# Patient Record
Sex: FEMALE | Race: BLACK OR AFRICAN AMERICAN | NOT HISPANIC OR LATINO
[De-identification: names, ages, dates, MRNs, and addresses within clinical notes are randomized per-mention and may not be internally consistent; named-entity substitution may affect disease eponyms.]

---

## 2019-05-02 ENCOUNTER — APPOINTMENT (OUTPATIENT)
Dept: OPHTHALMOLOGY | Facility: CLINIC | Age: 79
End: 2019-05-02
Payer: COMMERCIAL

## 2019-05-02 DIAGNOSIS — Z86.39 PERSONAL HISTORY OF OTHER ENDOCRINE, NUTRITIONAL AND METABOLIC DISEASE: ICD-10-CM

## 2019-05-02 DIAGNOSIS — H35.053 RETINAL NEOVASCULARIZATION, UNSPECIFIED, BILATERAL: ICD-10-CM

## 2019-05-02 DIAGNOSIS — Z78.9 OTHER SPECIFIED HEALTH STATUS: ICD-10-CM

## 2019-05-02 DIAGNOSIS — Z86.79 PERSONAL HISTORY OF OTHER DISEASES OF THE CIRCULATORY SYSTEM: ICD-10-CM

## 2019-05-02 DIAGNOSIS — H40.003 PREGLAUCOMA, UNSPECIFIED, BILATERAL: ICD-10-CM

## 2019-05-02 PROCEDURE — 92225: CPT | Mod: LT

## 2019-05-02 PROCEDURE — 92004 COMPRE OPH EXAM NEW PT 1/>: CPT

## 2019-05-02 PROCEDURE — 92134 CPTRZ OPH DX IMG PST SGM RTA: CPT

## 2019-09-05 ENCOUNTER — NON-APPOINTMENT (OUTPATIENT)
Age: 79
End: 2019-09-05

## 2019-09-05 ENCOUNTER — APPOINTMENT (OUTPATIENT)
Dept: OPHTHALMOLOGY | Facility: CLINIC | Age: 79
End: 2019-09-05
Payer: MEDICARE

## 2019-09-05 PROCEDURE — 76514 ECHO EXAM OF EYE THICKNESS: CPT

## 2019-09-05 PROCEDURE — 92012 INTRM OPH EXAM EST PATIENT: CPT

## 2019-09-05 PROCEDURE — 92083 EXTENDED VISUAL FIELD XM: CPT

## 2020-01-08 ENCOUNTER — APPOINTMENT (OUTPATIENT)
Dept: OPHTHALMOLOGY | Facility: CLINIC | Age: 80
End: 2020-01-08

## 2020-02-26 ENCOUNTER — NON-APPOINTMENT (OUTPATIENT)
Age: 80
End: 2020-02-26

## 2020-02-26 ENCOUNTER — APPOINTMENT (OUTPATIENT)
Dept: OPHTHALMOLOGY | Facility: CLINIC | Age: 80
End: 2020-02-26
Payer: MEDICARE

## 2020-02-26 PROCEDURE — 92012 INTRM OPH EXAM EST PATIENT: CPT

## 2020-03-26 ENCOUNTER — APPOINTMENT (OUTPATIENT)
Dept: OPHTHALMOLOGY | Facility: CLINIC | Age: 80
End: 2020-03-26

## 2020-10-22 ENCOUNTER — APPOINTMENT (OUTPATIENT)
Dept: CARDIOLOGY | Facility: CLINIC | Age: 80
End: 2020-10-22
Payer: MEDICARE

## 2020-10-22 ENCOUNTER — NON-APPOINTMENT (OUTPATIENT)
Age: 80
End: 2020-10-22

## 2020-10-22 VITALS
HEIGHT: 67 IN | SYSTOLIC BLOOD PRESSURE: 190 MMHG | WEIGHT: 240 LBS | RESPIRATION RATE: 20 BRPM | DIASTOLIC BLOOD PRESSURE: 100 MMHG | TEMPERATURE: 97.7 F | OXYGEN SATURATION: 97 % | BODY MASS INDEX: 37.67 KG/M2 | HEART RATE: 89 BPM

## 2020-10-22 DIAGNOSIS — R06.00 DYSPNEA, UNSPECIFIED: ICD-10-CM

## 2020-10-22 PROCEDURE — 99203 OFFICE O/P NEW LOW 30 MIN: CPT

## 2020-10-22 PROCEDURE — 93000 ELECTROCARDIOGRAM COMPLETE: CPT

## 2020-10-22 NOTE — PHYSICAL EXAM
[General Appearance - Well Developed] : well developed [Normal Appearance] : normal appearance [Well Groomed] : well groomed [General Appearance - Well Nourished] : well nourished [No Deformities] : no deformities [General Appearance - In No Acute Distress] : no acute distress [Normal Conjunctiva] : the conjunctiva exhibited no abnormalities [Eyelids - No Xanthelasma] : the eyelids demonstrated no xanthelasmas [Normal Oral Mucosa] : normal oral mucosa [No Oral Pallor] : no oral pallor [No Oral Cyanosis] : no oral cyanosis [Normal Jugular Venous A Waves Present] : normal jugular venous A waves present [Normal Jugular Venous V Waves Present] : normal jugular venous V waves present [No Jugular Venous Singh A Waves] : no jugular venous singh A waves [Normal Rate] : normal [Normal S1] : normal S1 [Normal S2] : normal S2 [No Murmur] : no murmurs heard [2+] : left 2+ [No Abnormalities] : the abdominal aorta was not enlarged and no bruit was heard [No Pitting Edema] : no pitting edema present [Respiration, Rhythm And Depth] : normal respiratory rhythm and effort [Exaggerated Use Of Accessory Muscles For Inspiration] : no accessory muscle use [Auscultation Breath Sounds / Voice Sounds] : lungs were clear to auscultation bilaterally [Abdomen Soft] : soft [Abdomen Tenderness] : non-tender [Abdomen Mass (___ Cm)] : no abdominal mass palpated [Abnormal Walk] : normal gait [Gait - Sufficient For Exercise Testing] : the gait was sufficient for exercise testing [Nail Clubbing] : no clubbing of the fingernails [Cyanosis, Localized] : no localized cyanosis [Petechial Hemorrhages (___cm)] : no petechial hemorrhages [Skin Color & Pigmentation] : normal skin color and pigmentation [] : no rash [No Venous Stasis] : no venous stasis [Skin Lesions] : no skin lesions [No Skin Ulcers] : no skin ulcer [No Xanthoma] : no  xanthoma was observed [Oriented To Time, Place, And Person] : oriented to person, place, and time [Affect] : the affect was normal [Mood] : the mood was normal [No Anxiety] : not feeling anxious [S3] : no S3 [S4] : no S4 [Right Carotid Bruit] : no bruit heard over the right carotid [Left Carotid Bruit] : no bruit heard over the left carotid [Right Femoral Bruit] : no bruit heard over the right femoral artery [Left Femoral Bruit] : no bruit heard over the left femoral artery

## 2020-10-22 NOTE — DISCUSSION/SUMMARY
[Hypertension] : hypertension [Not Responding to Treatment] : not responding to treatment [Echocardiogram] : echocardiogram [Venous Insufficiency] : venous insufficiency [Congestive Heart Failure] : congestive heart failure [Stable] : stable [de-identified] : paced [de-identified] : icd eval [de-identified] : did not take meds

## 2020-10-22 NOTE — REASON FOR VISIT
[Consultation] : a consultation regarding [FreeTextEntry2] : pt with h/o chf, icd, presumed cmp, htn admits to occasioan dyspnea and le edema switicng cardiologists

## 2020-11-25 ENCOUNTER — APPOINTMENT (OUTPATIENT)
Dept: CARDIOLOGY | Facility: CLINIC | Age: 80
End: 2020-11-25
Payer: MEDICARE

## 2020-11-25 PROCEDURE — 93306 TTE W/DOPPLER COMPLETE: CPT

## 2020-11-25 PROCEDURE — 93289 INTERROG DEVICE EVAL HEART: CPT

## 2020-11-25 NOTE — PROCEDURE
[CRT-D] : Cardiac resynchronization therapy defibrillator [DDD] : DDD [Longevity: ___ months] : The estimated remaining battery life is [unfilled] months [Sensing Amplitude ___mv] : sensing amplitude was [unfilled] mv [Lead Imp:  ___ohms] : lead impedance was [unfilled] ohms [___V @] : [unfilled] V [___ ms] : [unfilled] ms [de-identified] : BIV PACED [de-identified] : [facility] [de-identified] : N160 [de-identified] : 3/7/2013 [de-identified] :  [de-identified] : APACED 12%\par VPACED 100%\par 2 EPISODES OF SVT  LONGEST 8 SECONDS  170-180 BPM

## 2021-03-24 ENCOUNTER — APPOINTMENT (OUTPATIENT)
Dept: CARDIOLOGY | Facility: CLINIC | Age: 81
End: 2021-03-24
Payer: MEDICARE

## 2021-03-24 PROCEDURE — 99072 ADDL SUPL MATRL&STAF TM PHE: CPT

## 2021-03-24 PROCEDURE — 93289 INTERROG DEVICE EVAL HEART: CPT

## 2021-03-24 NOTE — PROCEDURE
[CRT-D] : Cardiac resynchronization therapy defibrillator [DDD] : DDD [Longevity: ___ months] : The estimated remaining battery life is [unfilled] months [Lead Imp:  ___ohms] : lead impedance was [unfilled] ohms [Sensing Amplitude ___mv] : sensing amplitude was [unfilled] mv [___V @] : [unfilled] V [___ ms] : [unfilled] ms [de-identified] : BIV PACED [de-identified] : [facility] [de-identified] : N160 [de-identified] : 3/7/2013 [de-identified] :  [de-identified] : APACED 15\par VPACED 100%\par no therapies

## 2021-07-28 ENCOUNTER — APPOINTMENT (OUTPATIENT)
Dept: CARDIOLOGY | Facility: CLINIC | Age: 81
End: 2021-07-28
Payer: MEDICARE

## 2021-07-28 PROCEDURE — 93289 INTERROG DEVICE EVAL HEART: CPT

## 2021-07-28 NOTE — PROCEDURE
[CRT-D] : Cardiac resynchronization therapy defibrillator [DDD] : DDD [Longevity: ___ months] : The estimated remaining battery life is [unfilled] months [Lead Imp:  ___ohms] : lead impedance was [unfilled] ohms [Sensing Amplitude ___mv] : sensing amplitude was [unfilled] mv [___V @] : [unfilled] V [___ ms] : [unfilled] ms [de-identified] : BIV PACED [de-identified] : [facility] [de-identified] : N160 [de-identified] : 3/7/2013 [de-identified] :  [de-identified] : APACED 13\par VPACED 100%\par 4 episodes of atrial tach 160 bpm longest 15 beats\par no therapies

## 2021-09-04 ENCOUNTER — EMERGENCY (EMERGENCY)
Facility: HOSPITAL | Age: 81
LOS: 1 days | Discharge: HOME | End: 2021-09-04
Attending: EMERGENCY MEDICINE | Admitting: EMERGENCY MEDICINE
Payer: MEDICARE

## 2021-09-04 VITALS
TEMPERATURE: 98 F | OXYGEN SATURATION: 100 % | HEART RATE: 73 BPM | SYSTOLIC BLOOD PRESSURE: 152 MMHG | RESPIRATION RATE: 16 BRPM | DIASTOLIC BLOOD PRESSURE: 73 MMHG

## 2021-09-04 VITALS
OXYGEN SATURATION: 100 % | RESPIRATION RATE: 16 BRPM | TEMPERATURE: 98 F | DIASTOLIC BLOOD PRESSURE: 87 MMHG | SYSTOLIC BLOOD PRESSURE: 179 MMHG | HEART RATE: 63 BPM

## 2021-09-04 LAB
ALBUMIN SERPL ELPH-MCNC: 4.8 G/DL — SIGNIFICANT CHANGE UP (ref 3.3–5)
ALP SERPL-CCNC: 73 U/L — SIGNIFICANT CHANGE UP (ref 40–120)
ALT FLD-CCNC: 11 U/L — SIGNIFICANT CHANGE UP (ref 4–33)
ANION GAP SERPL CALC-SCNC: 14 MMOL/L — SIGNIFICANT CHANGE UP (ref 7–14)
APPEARANCE UR: CLEAR — SIGNIFICANT CHANGE UP
AST SERPL-CCNC: 17 U/L — SIGNIFICANT CHANGE UP (ref 4–32)
BASE EXCESS BLDV CALC-SCNC: -0.4 MMOL/L — SIGNIFICANT CHANGE UP (ref -2–3)
BASOPHILS # BLD AUTO: 0.01 K/UL — SIGNIFICANT CHANGE UP (ref 0–0.2)
BASOPHILS # BLD AUTO: 0.03 K/UL — SIGNIFICANT CHANGE UP (ref 0–0.2)
BASOPHILS NFR BLD AUTO: 0.2 % — SIGNIFICANT CHANGE UP (ref 0–2)
BASOPHILS NFR BLD AUTO: 0.3 % — SIGNIFICANT CHANGE UP (ref 0–2)
BILIRUB SERPL-MCNC: 0.3 MG/DL — SIGNIFICANT CHANGE UP (ref 0.2–1.2)
BILIRUB UR-MCNC: NEGATIVE — SIGNIFICANT CHANGE UP
BLOOD GAS VENOUS COMPREHENSIVE RESULT: SIGNIFICANT CHANGE UP
BUN SERPL-MCNC: 15 MG/DL — SIGNIFICANT CHANGE UP (ref 7–23)
CALCIUM SERPL-MCNC: 9.8 MG/DL — SIGNIFICANT CHANGE UP (ref 8.4–10.5)
CHLORIDE BLDV-SCNC: 109 MMOL/L — HIGH (ref 96–108)
CHLORIDE SERPL-SCNC: 96 MMOL/L — LOW (ref 98–107)
CO2 BLDV-SCNC: 26.1 MMOL/L — HIGH (ref 22–26)
CO2 SERPL-SCNC: 25 MMOL/L — SIGNIFICANT CHANGE UP (ref 22–31)
COLOR SPEC: COLORLESS — SIGNIFICANT CHANGE UP
CREAT SERPL-MCNC: 0.83 MG/DL — SIGNIFICANT CHANGE UP (ref 0.5–1.3)
DIFF PNL FLD: NEGATIVE — SIGNIFICANT CHANGE UP
EOSINOPHIL # BLD AUTO: 0.01 K/UL — SIGNIFICANT CHANGE UP (ref 0–0.5)
EOSINOPHIL # BLD AUTO: 0.23 K/UL — SIGNIFICANT CHANGE UP (ref 0–0.5)
EOSINOPHIL NFR BLD AUTO: 0.2 % — SIGNIFICANT CHANGE UP (ref 0–6)
EOSINOPHIL NFR BLD AUTO: 1.9 % — SIGNIFICANT CHANGE UP (ref 0–6)
GAS PNL BLDV: 136 MMOL/L — SIGNIFICANT CHANGE UP (ref 136–145)
GLUCOSE BLDV-MCNC: 257 MG/DL — HIGH (ref 70–99)
GLUCOSE SERPL-MCNC: 142 MG/DL — HIGH (ref 70–99)
GLUCOSE UR QL: NEGATIVE — SIGNIFICANT CHANGE UP
HCO3 BLDV-SCNC: 25 MMOL/L — SIGNIFICANT CHANGE UP (ref 22–29)
HCT VFR BLD CALC: 38 % — SIGNIFICANT CHANGE UP (ref 34.5–45)
HCT VFR BLD CALC: 38.7 % — SIGNIFICANT CHANGE UP (ref 34.5–45)
HCT VFR BLDA CALC: 38 % — SIGNIFICANT CHANGE UP (ref 34.5–46.5)
HGB BLD CALC-MCNC: 12.8 G/DL — SIGNIFICANT CHANGE UP (ref 11.5–15.5)
HGB BLD-MCNC: 12.6 G/DL — SIGNIFICANT CHANGE UP (ref 11.5–15.5)
HGB BLD-MCNC: 13.2 G/DL — SIGNIFICANT CHANGE UP (ref 11.5–15.5)
IANC: 3.47 K/UL — SIGNIFICANT CHANGE UP (ref 1.5–8.5)
IANC: 7.58 K/UL — SIGNIFICANT CHANGE UP (ref 1.5–8.5)
IMM GRANULOCYTES NFR BLD AUTO: 0.2 % — SIGNIFICANT CHANGE UP (ref 0–1.5)
IMM GRANULOCYTES NFR BLD AUTO: 0.5 % — SIGNIFICANT CHANGE UP (ref 0–1.5)
KETONES UR-MCNC: ABNORMAL
LACTATE BLDV-MCNC: 1.7 MMOL/L — SIGNIFICANT CHANGE UP (ref 0.5–2)
LACTATE BLDV-MCNC: 3.1 MMOL/L — HIGH (ref 0.5–2)
LEUKOCYTE ESTERASE UR-ACNC: NEGATIVE — SIGNIFICANT CHANGE UP
LYMPHOCYTES # BLD AUTO: 0.95 K/UL — LOW (ref 1–3.3)
LYMPHOCYTES # BLD AUTO: 20.1 % — SIGNIFICANT CHANGE UP (ref 13–44)
LYMPHOCYTES # BLD AUTO: 25.9 % — SIGNIFICANT CHANGE UP (ref 13–44)
LYMPHOCYTES # BLD AUTO: 3.08 K/UL — SIGNIFICANT CHANGE UP (ref 1–3.3)
MCHC RBC-ENTMCNC: 30.4 PG — SIGNIFICANT CHANGE UP (ref 27–34)
MCHC RBC-ENTMCNC: 30.8 PG — SIGNIFICANT CHANGE UP (ref 27–34)
MCHC RBC-ENTMCNC: 33.2 GM/DL — SIGNIFICANT CHANGE UP (ref 32–36)
MCHC RBC-ENTMCNC: 34.1 GM/DL — SIGNIFICANT CHANGE UP (ref 32–36)
MCV RBC AUTO: 90.2 FL — SIGNIFICANT CHANGE UP (ref 80–100)
MCV RBC AUTO: 91.8 FL — SIGNIFICANT CHANGE UP (ref 80–100)
MONOCYTES # BLD AUTO: 0.27 K/UL — SIGNIFICANT CHANGE UP (ref 0–0.9)
MONOCYTES # BLD AUTO: 0.9 K/UL — SIGNIFICANT CHANGE UP (ref 0–0.9)
MONOCYTES NFR BLD AUTO: 5.7 % — SIGNIFICANT CHANGE UP (ref 2–14)
MONOCYTES NFR BLD AUTO: 7.6 % — SIGNIFICANT CHANGE UP (ref 2–14)
NEUTROPHILS # BLD AUTO: 3.47 K/UL — SIGNIFICANT CHANGE UP (ref 1.8–7.4)
NEUTROPHILS # BLD AUTO: 7.58 K/UL — HIGH (ref 1.8–7.4)
NEUTROPHILS NFR BLD AUTO: 63.8 % — SIGNIFICANT CHANGE UP (ref 43–77)
NEUTROPHILS NFR BLD AUTO: 73.6 % — SIGNIFICANT CHANGE UP (ref 43–77)
NITRITE UR-MCNC: NEGATIVE — SIGNIFICANT CHANGE UP
NRBC # BLD: 0 /100 WBCS — SIGNIFICANT CHANGE UP
NRBC # BLD: 0 /100 WBCS — SIGNIFICANT CHANGE UP
NRBC # FLD: 0 K/UL — SIGNIFICANT CHANGE UP
NRBC # FLD: 0 K/UL — SIGNIFICANT CHANGE UP
PCO2 BLDV: 42 MMHG — SIGNIFICANT CHANGE UP (ref 39–42)
PH BLDV: 7.38 — SIGNIFICANT CHANGE UP (ref 7.32–7.43)
PH UR: 7.5 — SIGNIFICANT CHANGE UP (ref 5–8)
PLATELET # BLD AUTO: 108 K/UL — LOW (ref 150–400)
PLATELET # BLD AUTO: 283 K/UL — SIGNIFICANT CHANGE UP (ref 150–400)
PO2 BLDV: 39 MMHG — SIGNIFICANT CHANGE UP
POTASSIUM BLDV-SCNC: 4.1 MMOL/L — SIGNIFICANT CHANGE UP (ref 3.5–5.1)
POTASSIUM SERPL-MCNC: 4.1 MMOL/L — SIGNIFICANT CHANGE UP (ref 3.5–5.3)
POTASSIUM SERPL-SCNC: 4.1 MMOL/L — SIGNIFICANT CHANGE UP (ref 3.5–5.3)
PROT SERPL-MCNC: 8.5 G/DL — HIGH (ref 6–8.3)
PROT UR-MCNC: NEGATIVE — SIGNIFICANT CHANGE UP
RBC # BLD: 4.14 M/UL — SIGNIFICANT CHANGE UP (ref 3.8–5.2)
RBC # BLD: 4.29 M/UL — SIGNIFICANT CHANGE UP (ref 3.8–5.2)
RBC # FLD: 12.9 % — SIGNIFICANT CHANGE UP (ref 10.3–14.5)
RBC # FLD: 13.2 % — SIGNIFICANT CHANGE UP (ref 10.3–14.5)
SAO2 % BLDV: 64.8 % — SIGNIFICANT CHANGE UP
SODIUM SERPL-SCNC: 135 MMOL/L — SIGNIFICANT CHANGE UP (ref 135–145)
SP GR SPEC: 1.01 — SIGNIFICANT CHANGE UP (ref 1–1.05)
TROPONIN T, HIGH SENSITIVITY RESULT: 10 NG/L — SIGNIFICANT CHANGE UP
UROBILINOGEN FLD QL: SIGNIFICANT CHANGE UP
WBC # BLD: 11.88 K/UL — HIGH (ref 3.8–10.5)
WBC # BLD: 4.72 K/UL — SIGNIFICANT CHANGE UP (ref 3.8–10.5)
WBC # FLD AUTO: 11.88 K/UL — HIGH (ref 3.8–10.5)
WBC # FLD AUTO: 4.72 K/UL — SIGNIFICANT CHANGE UP (ref 3.8–10.5)

## 2021-09-04 PROCEDURE — 93010 ELECTROCARDIOGRAM REPORT: CPT

## 2021-09-04 PROCEDURE — 99285 EMERGENCY DEPT VISIT HI MDM: CPT | Mod: 25

## 2021-09-04 RX ORDER — MECLIZINE HCL 12.5 MG
1 TABLET ORAL
Qty: 30 | Refills: 0
Start: 2021-09-04

## 2021-09-04 RX ORDER — SODIUM CHLORIDE 9 MG/ML
1000 INJECTION INTRAMUSCULAR; INTRAVENOUS; SUBCUTANEOUS ONCE
Refills: 0 | Status: COMPLETED | OUTPATIENT
Start: 2021-09-04 | End: 2021-09-04

## 2021-09-04 RX ORDER — MECLIZINE HCL 12.5 MG
50 TABLET ORAL ONCE
Refills: 0 | Status: COMPLETED | OUTPATIENT
Start: 2021-09-04 | End: 2021-09-04

## 2021-09-04 RX ORDER — METOCLOPRAMIDE HCL 10 MG
10 TABLET ORAL ONCE
Refills: 0 | Status: COMPLETED | OUTPATIENT
Start: 2021-09-04 | End: 2021-09-04

## 2021-09-04 RX ADMIN — SODIUM CHLORIDE 1000 MILLILITER(S): 9 INJECTION INTRAMUSCULAR; INTRAVENOUS; SUBCUTANEOUS at 04:02

## 2021-09-04 RX ADMIN — Medication 10 MILLIGRAM(S): at 03:25

## 2021-09-04 RX ADMIN — Medication 50 MILLIGRAM(S): at 03:24

## 2021-09-04 NOTE — ED ADULT NURSE REASSESSMENT NOTE - NS ED NURSE REASSESS COMMENT FT1
Pt A&Ox4, resting comfortably. Breathing even and unlabored. Pt was able to tolerate PO. Denies nausea/vomiting. Endorses mild dizziness but reports improvement since medications. Will endorse to day shift.

## 2021-09-04 NOTE — ED ADULT NURSE NOTE - OBJECTIVE STATEMENT
Pt received in rm 26. C/o dizziness, tinnitus, nausea and vomiting last night. States was getting ready to use the bathroom when she felt dizzy, had ringing in ears  and was nauseous. Denies LOC. States son was able to assist her. Pt took home medications but was not able to keep them down and vomited. Hx of CHF, HTN, and pacemaker. A&Ox4, ambulatory with a cane. Breathing even and unlabored. NSR on the cardiac monitor. No complaints of chest pain, headache, abd pain, nausea, vomiting, SOB, fever, or chills at this time. NAD. Waiting for MD beckham. Will continue to monitor.

## 2021-09-04 NOTE — ED PROVIDER NOTE - OBJECTIVE STATEMENT
80yo F h.o HTN/HLD, CHF presenting to ED with dizziness since last night around 10pm first noted when attempting to rise from couch, began mild however progressed to severe with limited ambulation, thereafter developed nausea and a few episodes of nb/nb vomiting. Dizziness is room spinning, improves when laying flat and worse with turning head to side, no headache or visual changes. Denies cp or sob, no recent illness or cough, no fever. ROS significant for diarrhea starting around the same time as the other symptoms. Currently dizziness is absent, however minimal when she is moving around.

## 2021-09-04 NOTE — ED PROVIDER NOTE - ST/T WAVE
How Severe Are They?: mild
Is This A New Presentation, Or A Follow-Up?: Follow Up [facility]
n/l

## 2021-09-04 NOTE — ED PROVIDER NOTE - ATTENDING CONTRIBUTION TO CARE
The patient is a 81y Female who has a past medical and surgery history of  PTED with Pt c/o dizziness, nausea and multiple episodes of vomiting since tonight. Pt states she was trying to get up to use the bathroom when she became dizzy. pt states it feels like the room is spinning  and worse with movement.    Vital Signs Stable  PE: as described; my additions and exceptions are noted in the chart  DATA:  EKG:   LAB: Pending at time of evaluation            IMPRESSION/RISK:  Dx=  Differential includes but not limited to conditions listed in order of most possible first:   Consideration include  Plan

## 2021-09-04 NOTE — ED PROVIDER NOTE - PROGRESS NOTE DETAILS
Juan Diego Hoang, PGY-2: Pt reexamined at bedside, resting comfortably, vitals stable. No longer complaining of dizziness or nausea after reglan and meclizine. Still has mild symptoms when turning head rapidly over right shoulder. Labs are wnl, currently awaiting repeat troponin. EKG non-ischemic PA Grancaric: Pt feeling better. Resting comfortably. Dr. Fortune: Pt was signed out to me awaiting repeat trop and re-eval. Pt states feeling better. DAILY Oliveira: pt passed PO challenge. Ambulating with steady gait. Pt given return precautions

## 2021-09-04 NOTE — ED PROVIDER NOTE - PATIENT PORTAL LINK FT
You can access the FollowMyHealth Patient Portal offered by [facility] by registering at the following website: [email or web address]. By joining [facility]’s FollowMyHealth portal, you will also be able to view your health information using other applications (apps) compatible with our system.

## 2021-09-04 NOTE — ED PROVIDER NOTE - PHYSICAL EXAMINATION
GENERAL: non-toxic appearing, in NAD  HEAD: atraumatic, normocephalic  EYES: vision grossly intact, no conjunctivitis or discharge  EARS: hearing grossly intact, TMs clear and no lesions within ear canal  NOSE: no nasal discharge, epistaxis   CARDIAC: RRR, normal S1S2,  no appreciable murmurs, no cyanosis, cap refill < 2 seconds  PULM: no respiratory distress, oxygen saturation on RA wnl, CTAB, no crackles, rales, rhonchi, or wheezing  GI: abdomen nondistended, soft, nontender, no guarding or rebound tenderness, no palpable masses  NEURO: awake and alert, follows commands, normal speech, PERRLA, EOMI, no focal motor or sensory deficits, normal gait. Strength 5/5  MSK: spine appears normal, no joint swelling or erythema, no gross deformities of extremities  EXT: no peripheral edema, calf tenderness, redness or swelling  SKIN: warm, dry, and intact, no rashes  PSYCH: appropriate mood and affect

## 2021-09-04 NOTE — ED ADULT TRIAGE NOTE - CHIEF COMPLAINT QUOTE
Pt c/o dizziness, nausea and multiple episodes of vomiting since tonight. Pt states she was trying to get up to use the bathroom when she became dizzy. pt states it feels like the room is spinning  and worse with movement. ..no

## 2021-09-04 NOTE — ED PROVIDER NOTE - NSFOLLOWUPINSTRUCTIONS_ED_ALL_ED_FT
The patient is a 81y Female who has a past medical and surgery history of  PTED with Pt c/o dizziness, nausea and multiple episodes of vomiting since tonight. Pt states she was trying to get up to use the bathroom when she became dizzy. pt states it feels like the room is spinning  and worse with movement.    Vital Signs Stable  PE: as described; my additions and exceptions are noted in the chart  DATA:  EKG:   LAB: Pending at time of evaluation            IMPRESSION/RISK:  Dx=  Differential includes but not limited to conditions listed in order of most possible first:   Consideration include  Plan Vertigo  Vertigo is the feeling that you or your surroundings are moving when they are not. Vertigo can be dangerous if it occurs while you are doing something that could endanger you or others, such as driving.    What are the causes?  This condition is caused by a disturbance in the signals that are sent by your body’s sensory systems to your brain. Different causes of a disturbance can lead to vertigo, including:    Infections, especially in the inner ear.  A bad reaction to a drug, or misuse of alcohol and medicines.  Withdrawal from drugs or alcohol.  Quickly changing positions, as when lying down or rolling over in bed.  Migraine headaches.  Decreased blood flow to the brain.  Decreased blood pressure.  Increased pressure in the brain from a head or neck injury, stroke, infection, tumor, or bleeding.  Central nervous system disorders.    What are the signs or symptoms?  Symptoms of this condition usually occur when you move your head or your eyes in different directions. Symptoms may start suddenly, and they usually last for less than a minute. Symptoms may include:    Loss of balance and falling.  Feeling like you are spinning or moving.  Feeling like your surroundings are spinning or moving.  Nausea and vomiting.  Blurred vision or double vision.  Difficulty hearing.  Slurred speech.  Dizziness.  Involuntary eye movement (nystagmus).    Symptoms can be mild and cause only slight annoyance, or they can be severe and interfere with daily life. Episodes of vertigo may return (recur) over time, and they are often triggered by certain movements. Symptoms may improve over time.    How is this diagnosed?  This condition may be diagnosed based on medical history and the quality of your nystagmus. Your health care provider may test your eye movements by asking you to quickly change positions to trigger the nystagmus. This may be called the Marcos-Hallpike test, head thrust test, or roll test. You may be referred to a health care provider who specializes in ear, nose, and throat (ENT) problems (otolaryngologist) or a provider who specializes in disorders of the central nervous system (neurologist).    You may have additional testing, including:    A physical exam.  Blood tests.  MRI.  A CT scan.  An electrocardiogram (ECG). This records electrical activity in your heart.  An electroencephalogram (EEG). This records electrical activity in your brain.  Hearing tests.    How is this treated?  Treatment for this condition depends on the cause and the severity of the symptoms. Treatment options include:    Medicines to treat nausea or vertigo. These are usually used for severe cases. Some medicines that are used to treat other conditions may also reduce or eliminate vertigo symptoms. These include:    Medicines that control allergies (antihistamines).  Medicines that control seizures (anticonvulsants).  Medicines that relieve depression (antidepressants).  Medicines that relieve anxiety (sedatives).    Head movements to adjust your inner ear back to normal. If your vertigo is caused by an ear problem, your health care provider may recommend certain movements to correct the problem.  Surgery. This is rare.    Follow these instructions at home:  Safety     Move slowly.Avoid sudden body or head movements.  Avoid driving.  Avoid operating heavy machinery.  Avoid doing any tasks that would cause danger to you or others if you would have a vertigo episode during the task.  If you have trouble walking or keeping your balance, try using a cane for stability. If you feel dizzy or unstable, sit down right away.  Return to your normal activities as told by your health care provider. Ask your health care provider what activities are safe for you.  General instructions     Take over-the-counter and prescription medicines only as told by your health care provider.  Avoid certain positions or movements as told by your health care provider.  Drink enough fluid to keep your urine clear or pale yellow.  Keep all follow-up visits as told by your health care provider. This is important.  Contact a health care provider if:  Your medicines do not relieve your vertigo or they make it worse.  You have a fever.  Your condition gets worse or you develop new symptoms.  Your family or friends notice any behavioral changes.  Your nausea or vomiting gets worse.  You have numbness or a “pins and needles” sensation in part of your body.  Get help right away if:  You have difficulty moving or speaking.  You are always dizzy.  You faint.  You develop severe headaches.  You have weakness in your hands, arms, or legs.  You have changes in your hearing or vision.  You develop a stiff neck.  You develop sensitivity to light.

## 2021-09-04 NOTE — ED PROVIDER NOTE - CLINICAL SUMMARY MEDICAL DECISION MAKING FREE TEXT BOX
82yo F w positional dizziness preceding nausea and vomiting, improved when still, no nystagmus on exam and no signs of central vertigo. NIHSS stroke scale 0. Will provide symptomatic control with Reglan and meclizine, EKG is non-ischemic and follow up with trop x2. PO challenge if all improves

## 2021-09-05 LAB
CULTURE RESULTS: SIGNIFICANT CHANGE UP
SPECIMEN SOURCE: SIGNIFICANT CHANGE UP

## 2021-11-03 ENCOUNTER — APPOINTMENT (OUTPATIENT)
Dept: CARDIOLOGY | Facility: CLINIC | Age: 81
End: 2021-11-03

## 2021-11-23 ENCOUNTER — APPOINTMENT (OUTPATIENT)
Dept: CARDIOLOGY | Facility: CLINIC | Age: 81
End: 2021-11-23
Payer: MEDICARE

## 2021-11-23 PROCEDURE — 93289 INTERROG DEVICE EVAL HEART: CPT

## 2021-11-23 NOTE — PROCEDURE
[CRT-D] : Cardiac resynchronization therapy defibrillator [DDD] : DDD [Longevity: ___ months] : The estimated remaining battery life is [unfilled] months [Lead Imp:  ___ohms] : lead impedance was [unfilled] ohms [Sensing Amplitude ___mv] : sensing amplitude was [unfilled] mv [___V @] : [unfilled] V [___ ms] : [unfilled] ms [de-identified] : BIV PACED [de-identified] : [facility] [de-identified] : N160 [de-identified] : 3/7/2013 [de-identified] :  [de-identified] : APACED 14\par VPACED 100%\par 2 episodes of atrial tach 100 bpm longest 11 beats\par 1 episode of vt 19 beats 166\par no therapies

## 2022-01-19 ENCOUNTER — APPOINTMENT (OUTPATIENT)
Dept: CARDIOLOGY | Facility: CLINIC | Age: 82
End: 2022-01-19
Payer: MEDICARE

## 2022-01-19 ENCOUNTER — NON-APPOINTMENT (OUTPATIENT)
Age: 82
End: 2022-01-19

## 2022-01-19 VITALS
HEART RATE: 68 BPM | OXYGEN SATURATION: 96 % | SYSTOLIC BLOOD PRESSURE: 158 MMHG | WEIGHT: 240 LBS | BODY MASS INDEX: 37.67 KG/M2 | DIASTOLIC BLOOD PRESSURE: 110 MMHG | HEIGHT: 67 IN

## 2022-01-19 VITALS — SYSTOLIC BLOOD PRESSURE: 148 MMHG | DIASTOLIC BLOOD PRESSURE: 90 MMHG

## 2022-01-19 PROCEDURE — 93000 ELECTROCARDIOGRAM COMPLETE: CPT

## 2022-01-19 PROCEDURE — 99215 OFFICE O/P EST HI 40 MIN: CPT

## 2022-01-19 RX ORDER — PRAVASTATIN SODIUM 40 MG/1
40 TABLET ORAL
Refills: 0 | Status: ACTIVE | COMMUNITY

## 2022-01-19 RX ORDER — FLUTICASONE PROPIONATE 50 MCG
50 SPRAY, SUSPENSION NASAL
Refills: 0 | Status: ACTIVE | COMMUNITY

## 2022-01-19 RX ORDER — FUROSEMIDE 40 MG/1
40 TABLET ORAL
Qty: 30 | Refills: 3 | Status: ACTIVE | COMMUNITY

## 2022-01-19 RX ORDER — CARVEDILOL 25 MG/1
25 TABLET, FILM COATED ORAL TWICE DAILY
Qty: 180 | Refills: 0 | Status: ACTIVE | COMMUNITY

## 2022-01-19 RX ORDER — MECLIZINE HYDROCHLORIDE 25 MG/1
25 TABLET ORAL
Refills: 0 | Status: ACTIVE | COMMUNITY

## 2022-01-19 RX ORDER — ENALAPRIL MALEATE 20 MG/1
20 TABLET ORAL
Qty: 180 | Refills: 0 | Status: ACTIVE | COMMUNITY

## 2022-01-19 RX ORDER — GABAPENTIN 300 MG/1
300 CAPSULE ORAL
Refills: 0 | Status: ACTIVE | COMMUNITY

## 2022-01-19 NOTE — REASON FOR VISIT
[Follow-Up - Clinic] : a clinic follow-up of [FreeTextEntry2] : pt with h/o chf, icd, presumed cmp, htn admits to occasional dyspnea and le edema, pt last seen 10/2020, pt c/o occas vertigo, tinnitis

## 2022-01-19 NOTE — DISCUSSION/SUMMARY
[Hypertension] : hypertension [Venous Insufficiency] : venous insufficiency [Echocardiogram] : an echocardiogram [Stable] : stable [Outpatient Evaluation] : outpatient evaluation [Ambulatory BP Monitoring] : ambulatory blood pressure monitoring [Medication Changes Per Orders] : Medication changes are as documented in orders [Minutes Spent: ___] : for [unfilled] ~Uminutes [de-identified] : paced [de-identified] : icd eval [de-identified] : rsume low dose norvasc she self d/cd [FreeTextEntry2] : reviewed prior w/u and med list

## 2022-03-04 ENCOUNTER — APPOINTMENT (OUTPATIENT)
Dept: CARDIOLOGY | Facility: CLINIC | Age: 82
End: 2022-03-04
Payer: MEDICARE

## 2022-03-04 PROCEDURE — 93306 TTE W/DOPPLER COMPLETE: CPT

## 2022-03-23 ENCOUNTER — APPOINTMENT (OUTPATIENT)
Dept: CARDIOLOGY | Facility: CLINIC | Age: 82
End: 2022-03-23
Payer: MEDICARE

## 2022-03-23 PROCEDURE — 93289 INTERROG DEVICE EVAL HEART: CPT

## 2022-03-23 NOTE — PROCEDURE
[CRT-D] : Cardiac resynchronization therapy defibrillator [DDD] : DDD [Longevity: ___ months] : The estimated remaining battery life is [unfilled] months [Lead Imp:  ___ohms] : lead impedance was [unfilled] ohms [Sensing Amplitude ___mv] : sensing amplitude was [unfilled] mv [___V @] : [unfilled] V [___ ms] : [unfilled] ms [de-identified] : BIV PACED [de-identified] : [facility] [de-identified] : N160 [de-identified] : 3/7/2013 [de-identified] :  [de-identified] : APACED 6\par VPACED 100%\par 1 episode at 6 seconds\par 1 episode of vt 5 beats 200 bpm\par no therapies

## 2022-03-24 ENCOUNTER — APPOINTMENT (OUTPATIENT)
Dept: OTOLARYNGOLOGY | Facility: CLINIC | Age: 82
End: 2022-03-24
Payer: MEDICARE

## 2022-03-24 VITALS — HEART RATE: 87 BPM | SYSTOLIC BLOOD PRESSURE: 151 MMHG | DIASTOLIC BLOOD PRESSURE: 85 MMHG

## 2022-03-24 VITALS — BODY MASS INDEX: 37.67 KG/M2 | WEIGHT: 240 LBS | HEIGHT: 67 IN

## 2022-03-24 DIAGNOSIS — H90.5 UNSPECIFIED SENSORINEURAL HEARING LOSS: ICD-10-CM

## 2022-03-24 DIAGNOSIS — H81.90 UNSPECIFIED DISORDER OF VESTIBULAR FUNCTION, UNSPECIFIED EAR: ICD-10-CM

## 2022-03-24 DIAGNOSIS — Z78.9 OTHER SPECIFIED HEALTH STATUS: ICD-10-CM

## 2022-03-24 DIAGNOSIS — R42 DIZZINESS AND GIDDINESS: ICD-10-CM

## 2022-03-24 DIAGNOSIS — H90.3 SENSORINEURAL HEARING LOSS, BILATERAL: ICD-10-CM

## 2022-03-24 PROCEDURE — 99204 OFFICE O/P NEW MOD 45 MIN: CPT

## 2022-03-24 PROCEDURE — 92504 EAR MICROSCOPY EXAMINATION: CPT

## 2022-03-24 NOTE — HISTORY OF PRESENT ILLNESS
[de-identified] : 81 year old female here initial consultation referred by Dr. Wilfred Rios (ENT) for vertigo. \par States vertigo started in September 2021- reports feeling like the rooms was spinning.  \par Reports associated symptoms of nausea, vomiting and diarrhea, difficulty walking. \par Reports being hospitalized overnight and discharged home with Meclizine \par States after being discharged "vertigo kept coming back"- PCP prescribed Flonase\par Reports intermittent "slight" dizziness-uses Flonase with relief.  \par Reports tinnitus in right ear.-states it "sounds like crickets" \par States tinnitus fluctuates-louder in quiet rooms \par Reports hardly hearing anything from right ear when she lays on left side. \par Denies use of hearing aids. Patient denies otalgia, otorrhea, ear infections, headaches related to hearing.

## 2022-03-24 NOTE — DATA REVIEWED
[de-identified] : I have independently reviewed the patient's audiogram from [facility] and my findings include homero SNHL, R asymmetry with 68% SDS [de-identified] : CT reviewed, normal (no stroke)

## 2022-03-24 NOTE — REASON FOR VISIT
[Initial Consultation] : an initial consultation for [FreeTextEntry2] : referred by Dr. Rios for vertigo

## 2022-03-24 NOTE — CONSULT LETTER
[Dear  ___] : Dear  [unfilled], [Sincerely,] : Sincerely, [Consult Letter:] : I had the pleasure of evaluating your patient, [unfilled]. [Please see my note below.] : Please see my note below. [Consult Closing:] : Thank you very much for allowing me to participate in the care of this patient.  If you have any questions, please do not hesitate to contact me. [FreeTextEntry2] : Wilfred Rios MD [FreeTextEntry3] : Octavio Murrell MD, Otology Neurotology & Skull Base Surgery

## 2022-03-24 NOTE — PHYSICAL EXAM
[Midline] : trachea located in midline position [Binocular Microscopic Exam] : Binocular microscopic exam was performed [Hearing Loss Right Only] : normal [Hearing Loss Left Only] : normal [Rinne Test Air Conduction Persists > Bone Conduction Right] : air conduction greater than bone conduction on the right [Rinne Test Air Conduction Persists > Bone Conduction Left] : air conduction greater than bone conduction on the left [Normal] : cerebellar function was normal [Nystagmus] : ~T no ~M nystagmus was seen [Fukuda Step Test] : Fukuda Step Test was Negative [Romberg's Sign] : Romberg's sign was absent [Fistula Sign] : Fistula Sign: Negative [Past-Pointing] : Past-Pointing: Negative [de-identified] : wide based, unsteady, uses cane

## 2022-03-24 NOTE — PROCEDURE
[Vertigo] : Vertigo [Same] : same as the Pre Op Dx. [] : Binocular Microscopy [FreeTextEntry6] : Operative microscope was used to examine the ear canal, ear drum, and visible middle ear landmarks. Adequate exam would not have been possible without the use of a microscope. Findings are described.

## 2022-06-22 ENCOUNTER — APPOINTMENT (OUTPATIENT)
Dept: CARDIOLOGY | Facility: CLINIC | Age: 82
End: 2022-06-22
Payer: MEDICARE

## 2022-06-22 ENCOUNTER — NON-APPOINTMENT (OUTPATIENT)
Age: 82
End: 2022-06-22

## 2022-06-22 VITALS — DIASTOLIC BLOOD PRESSURE: 90 MMHG | SYSTOLIC BLOOD PRESSURE: 140 MMHG

## 2022-06-22 VITALS — WEIGHT: 252 LBS | OXYGEN SATURATION: 96 % | HEART RATE: 87 BPM | BODY MASS INDEX: 39.55 KG/M2 | HEIGHT: 67 IN

## 2022-06-22 VITALS — SYSTOLIC BLOOD PRESSURE: 135 MMHG | DIASTOLIC BLOOD PRESSURE: 84 MMHG

## 2022-06-22 DIAGNOSIS — R94.31 ABNORMAL ELECTROCARDIOGRAM [ECG] [EKG]: ICD-10-CM

## 2022-06-22 DIAGNOSIS — G89.29 PAIN IN RIGHT KNEE: ICD-10-CM

## 2022-06-22 DIAGNOSIS — M25.562 PAIN IN RIGHT KNEE: ICD-10-CM

## 2022-06-22 DIAGNOSIS — M25.561 PAIN IN RIGHT KNEE: ICD-10-CM

## 2022-06-22 PROCEDURE — 93000 ELECTROCARDIOGRAM COMPLETE: CPT

## 2022-06-22 PROCEDURE — 99215 OFFICE O/P EST HI 40 MIN: CPT

## 2022-06-22 NOTE — REASON FOR VISIT
[Follow-Up - Clinic] : a clinic follow-up of [FreeTextEntry2] : pt with h/o chf, icd, presumed cmp, htn admits to occasional dyspnea and le edema,  pt c/o occas vertigo, tinnitis, pt now planning TKR surgery

## 2022-06-22 NOTE — PHYSICAL EXAM
[General Appearance - Well Developed] : well developed [Normal Appearance] : normal appearance [Well Groomed] : well groomed [General Appearance - Well Nourished] : well nourished [No Deformities] : no deformities [General Appearance - In No Acute Distress] : no acute distress [Normal Conjunctiva] : the conjunctiva exhibited no abnormalities [Eyelids - No Xanthelasma] : the eyelids demonstrated no xanthelasmas [Normal Oral Mucosa] : normal oral mucosa [No Oral Pallor] : no oral pallor [No Oral Cyanosis] : no oral cyanosis [Normal Jugular Venous A Waves Present] : normal jugular venous A waves present [Normal Jugular Venous V Waves Present] : normal jugular venous V waves present [No Jugular Venous Isngh A Waves] : no jugular venous singh A waves [Normal Rate] : normal [Normal S1] : normal S1 [Normal S2] : normal S2 [No Murmur] : no murmurs heard [2+] : left 2+ [No Abnormalities] : the abdominal aorta was not enlarged and no bruit was heard [No Pitting Edema] : no pitting edema present [Respiration, Rhythm And Depth] : normal respiratory rhythm and effort [Exaggerated Use Of Accessory Muscles For Inspiration] : no accessory muscle use [Auscultation Breath Sounds / Voice Sounds] : lungs were clear to auscultation bilaterally [Abdomen Soft] : soft [Abdomen Tenderness] : non-tender [Abdomen Mass (___ Cm)] : no abdominal mass palpated [Nail Clubbing] : no clubbing of the fingernails [Cyanosis, Localized] : no localized cyanosis [Petechial Hemorrhages (___cm)] : no petechial hemorrhages [Skin Color & Pigmentation] : normal skin color and pigmentation [] : no rash [No Venous Stasis] : no venous stasis [Skin Lesions] : no skin lesions [No Skin Ulcers] : no skin ulcer [No Xanthoma] : no  xanthoma was observed [Oriented To Time, Place, And Person] : oriented to person, place, and time [Affect] : the affect was normal [Mood] : the mood was normal [No Anxiety] : not feeling anxious [S3] : no S3 [S4] : no S4 [Right Carotid Bruit] : no bruit heard over the right carotid [Left Carotid Bruit] : no bruit heard over the left carotid [Right Femoral Bruit] : no bruit heard over the right femoral artery [Left Femoral Bruit] : no bruit heard over the left femoral artery [FreeTextEntry1] : pt uses a cane, cant walk on treadmill, pending knee replacement

## 2022-06-22 NOTE — DISCUSSION/SUMMARY
[Hypertension] : hypertension [Outpatient Evaluation] : outpatient evaluation [Ambulatory BP Monitoring] : ambulatory blood pressure monitoring [Echocardiogram] : echocardiogram [Venous Insufficiency] : venous insufficiency [Stable] : stable [Minutes Spent: ___] : for [unfilled] ~Uminutes [Responding to Treatment] : responding to treatment [None] : There are no changes in medication management [Stress Test Pharmacologic] : a pharmacologic stress test [de-identified] : paced [de-identified] : icd eval [FreeTextEntry2] : reviewed prior w/u and med list

## 2022-07-19 PROBLEM — H40.003 GLAUCOMA SUSPECT OF BOTH EYES: Status: ACTIVE | Noted: 2019-05-02

## 2022-07-27 ENCOUNTER — APPOINTMENT (OUTPATIENT)
Dept: CARDIOLOGY | Facility: CLINIC | Age: 82
End: 2022-07-27

## 2022-07-27 PROCEDURE — 93289 INTERROG DEVICE EVAL HEART: CPT

## 2022-07-27 NOTE — PROCEDURE
[CRT-D] : Cardiac resynchronization therapy defibrillator [DDD] : DDD [Longevity: ___ months] : The estimated remaining battery life is [unfilled] months [Lead Imp:  ___ohms] : lead impedance was [unfilled] ohms [Sensing Amplitude ___mv] : sensing amplitude was [unfilled] mv [___V @] : [unfilled] V [___ ms] : [unfilled] ms [de-identified] : BIV PACED [de-identified] : N160 [de-identified] : [facility] [de-identified] : 3/7/2013 [de-identified] :  [de-identified] : APACED \par VPACED 100%\par 6 episodes of atach 170 bpm 9 beats the longest available\par no therapies

## 2022-08-18 ENCOUNTER — APPOINTMENT (OUTPATIENT)
Dept: CARDIOLOGY | Facility: CLINIC | Age: 82
End: 2022-08-18
Payer: MEDICARE

## 2022-08-18 PROCEDURE — 78452 HT MUSCLE IMAGE SPECT MULT: CPT

## 2022-08-18 PROCEDURE — 93015 CV STRESS TEST SUPVJ I&R: CPT

## 2022-08-18 PROCEDURE — A9500: CPT

## 2022-08-22 DIAGNOSIS — R93.1 ABNORMAL FINDINGS ON DIAGNOSTIC IMAGING OF HEART AND CORONARY CIRCULATION: ICD-10-CM

## 2022-08-30 ENCOUNTER — OUTPATIENT (OUTPATIENT)
Dept: OUTPATIENT SERVICES | Facility: HOSPITAL | Age: 82
LOS: 1 days | End: 2022-08-30
Payer: COMMERCIAL

## 2022-08-30 DIAGNOSIS — Z11.52 ENCOUNTER FOR SCREENING FOR COVID-19: ICD-10-CM

## 2022-08-30 LAB — SARS-COV-2 RNA SPEC QL NAA+PROBE: SIGNIFICANT CHANGE UP

## 2022-08-30 PROCEDURE — U0003: CPT

## 2022-08-30 PROCEDURE — U0005: CPT

## 2022-08-30 PROCEDURE — C9803: CPT

## 2022-09-01 ENCOUNTER — NON-APPOINTMENT (OUTPATIENT)
Age: 82
End: 2022-09-01

## 2022-09-02 ENCOUNTER — TRANSCRIPTION ENCOUNTER (OUTPATIENT)
Age: 82
End: 2022-09-02

## 2022-09-02 ENCOUNTER — INPATIENT (INPATIENT)
Facility: HOSPITAL | Age: 82
LOS: 0 days | Discharge: HOME | DRG: 287 | End: 2022-09-03
Attending: INTERNAL MEDICINE | Admitting: INTERNAL MEDICINE
Payer: COMMERCIAL

## 2022-09-02 VITALS
HEART RATE: 75 BPM | HEIGHT: 67 IN | OXYGEN SATURATION: 95 % | DIASTOLIC BLOOD PRESSURE: 77 MMHG | RESPIRATION RATE: 18 BRPM | SYSTOLIC BLOOD PRESSURE: 171 MMHG | TEMPERATURE: 98 F | WEIGHT: 253.09 LBS

## 2022-09-02 DIAGNOSIS — R94.39 ABNORMAL RESULT OF OTHER CARDIOVASCULAR FUNCTION STUDY: ICD-10-CM

## 2022-09-02 DIAGNOSIS — R60.0 LOCALIZED EDEMA: ICD-10-CM

## 2022-09-02 LAB
ANION GAP SERPL CALC-SCNC: 10 MMOL/L — SIGNIFICANT CHANGE UP (ref 5–17)
BUN SERPL-MCNC: 15 MG/DL — SIGNIFICANT CHANGE UP (ref 7–23)
CALCIUM SERPL-MCNC: 9.5 MG/DL — SIGNIFICANT CHANGE UP (ref 8.4–10.5)
CHLORIDE SERPL-SCNC: 101 MMOL/L — SIGNIFICANT CHANGE UP (ref 96–108)
CO2 SERPL-SCNC: 27 MMOL/L — SIGNIFICANT CHANGE UP (ref 22–31)
CREAT SERPL-MCNC: 0.94 MG/DL — SIGNIFICANT CHANGE UP (ref 0.5–1.3)
EGFR: 61 ML/MIN/1.73M2 — SIGNIFICANT CHANGE UP
GLUCOSE SERPL-MCNC: 113 MG/DL — HIGH (ref 70–99)
HCT VFR BLD CALC: 37.5 % — SIGNIFICANT CHANGE UP (ref 34.5–45)
HGB BLD-MCNC: 12.3 G/DL — SIGNIFICANT CHANGE UP (ref 11.5–15.5)
MCHC RBC-ENTMCNC: 30.6 PG — SIGNIFICANT CHANGE UP (ref 27–34)
MCHC RBC-ENTMCNC: 32.8 GM/DL — SIGNIFICANT CHANGE UP (ref 32–36)
MCV RBC AUTO: 93.3 FL — SIGNIFICANT CHANGE UP (ref 80–100)
NRBC # BLD: 0 /100 WBCS — SIGNIFICANT CHANGE UP (ref 0–0)
PLATELET # BLD AUTO: 94 K/UL — LOW (ref 150–400)
POTASSIUM SERPL-MCNC: 4.1 MMOL/L — SIGNIFICANT CHANGE UP (ref 3.5–5.3)
POTASSIUM SERPL-SCNC: 4.1 MMOL/L — SIGNIFICANT CHANGE UP (ref 3.5–5.3)
RBC # BLD: 4.02 M/UL — SIGNIFICANT CHANGE UP (ref 3.8–5.2)
RBC # FLD: 13.6 % — SIGNIFICANT CHANGE UP (ref 10.3–14.5)
SODIUM SERPL-SCNC: 138 MMOL/L — SIGNIFICANT CHANGE UP (ref 135–145)
WBC # BLD: 3.35 K/UL — LOW (ref 3.8–10.5)
WBC # FLD AUTO: 3.35 K/UL — LOW (ref 3.8–10.5)

## 2022-09-02 PROCEDURE — 93454 CORONARY ARTERY ANGIO S&I: CPT | Mod: 26

## 2022-09-02 PROCEDURE — 93010 ELECTROCARDIOGRAM REPORT: CPT

## 2022-09-02 PROCEDURE — 99152 MOD SED SAME PHYS/QHP 5/>YRS: CPT

## 2022-09-02 RX ORDER — AMLODIPINE BESYLATE 2.5 MG/1
5 TABLET ORAL ONCE
Refills: 0 | Status: COMPLETED | OUTPATIENT
Start: 2022-09-02 | End: 2022-09-02

## 2022-09-02 RX ORDER — LABETALOL HCL 100 MG
10 TABLET ORAL ONCE
Refills: 0 | Status: COMPLETED | OUTPATIENT
Start: 2022-09-02 | End: 2022-09-02

## 2022-09-02 RX ORDER — MECLIZINE HCL 12.5 MG
1 TABLET ORAL
Qty: 0 | Refills: 0 | DISCHARGE
Start: 2022-09-02

## 2022-09-02 RX ORDER — ASPIRIN/CALCIUM CARB/MAGNESIUM 324 MG
81 TABLET ORAL DAILY
Refills: 0 | Status: DISCONTINUED | OUTPATIENT
Start: 2022-09-02 | End: 2022-09-03

## 2022-09-02 RX ORDER — CARVEDILOL PHOSPHATE 80 MG/1
1 CAPSULE, EXTENDED RELEASE ORAL
Qty: 0 | Refills: 0 | DISCHARGE

## 2022-09-02 RX ORDER — LEVOTHYROXINE SODIUM 125 MCG
1 TABLET ORAL
Qty: 0 | Refills: 0 | DISCHARGE

## 2022-09-02 RX ORDER — LEVOTHYROXINE SODIUM 125 MCG
1 TABLET ORAL
Qty: 0 | Refills: 0 | DISCHARGE
Start: 2022-09-02

## 2022-09-02 RX ORDER — GABAPENTIN 400 MG/1
1 CAPSULE ORAL
Qty: 0 | Refills: 0 | DISCHARGE
Start: 2022-09-02

## 2022-09-02 RX ORDER — GABAPENTIN 400 MG/1
300 CAPSULE ORAL DAILY
Refills: 0 | Status: DISCONTINUED | OUTPATIENT
Start: 2022-09-02 | End: 2022-09-03

## 2022-09-02 RX ORDER — MECLIZINE HCL 12.5 MG
25 TABLET ORAL EVERY 6 HOURS
Refills: 0 | Status: DISCONTINUED | OUTPATIENT
Start: 2022-09-02 | End: 2022-09-03

## 2022-09-02 RX ORDER — ASPIRIN/CALCIUM CARB/MAGNESIUM 324 MG
1 TABLET ORAL
Qty: 0 | Refills: 0 | DISCHARGE
Start: 2022-09-02

## 2022-09-02 RX ORDER — MECLIZINE HCL 12.5 MG
25 TABLET ORAL DAILY
Refills: 0 | Status: DISCONTINUED | OUTPATIENT
Start: 2022-09-02 | End: 2022-09-02

## 2022-09-02 RX ORDER — HYDRALAZINE HCL 50 MG
10 TABLET ORAL ONCE
Refills: 0 | Status: COMPLETED | OUTPATIENT
Start: 2022-09-02 | End: 2022-09-02

## 2022-09-02 RX ORDER — LEVOTHYROXINE SODIUM 125 MCG
125 TABLET ORAL DAILY
Refills: 0 | Status: DISCONTINUED | OUTPATIENT
Start: 2022-09-02 | End: 2022-09-03

## 2022-09-02 RX ORDER — GABAPENTIN 400 MG/1
1 CAPSULE ORAL
Qty: 0 | Refills: 0 | DISCHARGE

## 2022-09-02 RX ORDER — ASPIRIN/CALCIUM CARB/MAGNESIUM 324 MG
1 TABLET ORAL
Qty: 0 | Refills: 0 | DISCHARGE

## 2022-09-02 RX ORDER — SODIUM CHLORIDE 9 MG/ML
250 INJECTION INTRAMUSCULAR; INTRAVENOUS; SUBCUTANEOUS ONCE
Refills: 0 | Status: COMPLETED | OUTPATIENT
Start: 2022-09-02 | End: 2022-09-02

## 2022-09-02 RX ORDER — ATORVASTATIN CALCIUM 80 MG/1
40 TABLET, FILM COATED ORAL AT BEDTIME
Refills: 0 | Status: DISCONTINUED | OUTPATIENT
Start: 2022-09-02 | End: 2022-09-03

## 2022-09-02 RX ORDER — CARVEDILOL PHOSPHATE 80 MG/1
25 CAPSULE, EXTENDED RELEASE ORAL EVERY 12 HOURS
Refills: 0 | Status: DISCONTINUED | OUTPATIENT
Start: 2022-09-02 | End: 2022-09-03

## 2022-09-02 RX ADMIN — Medication 10 MILLIGRAM(S): at 19:53

## 2022-09-02 RX ADMIN — SODIUM CHLORIDE 750 MILLILITER(S): 9 INJECTION INTRAMUSCULAR; INTRAVENOUS; SUBCUTANEOUS at 10:10

## 2022-09-02 RX ADMIN — Medication 25 MILLIGRAM(S): at 22:15

## 2022-09-02 RX ADMIN — Medication 10 MILLIGRAM(S): at 17:10

## 2022-09-02 RX ADMIN — Medication 20 MILLIGRAM(S): at 18:56

## 2022-09-02 RX ADMIN — Medication 10 MILLIGRAM(S): at 20:39

## 2022-09-02 RX ADMIN — Medication 10 MILLIGRAM(S): at 18:50

## 2022-09-02 RX ADMIN — AMLODIPINE BESYLATE 5 MILLIGRAM(S): 2.5 TABLET ORAL at 17:45

## 2022-09-02 RX ADMIN — ATORVASTATIN CALCIUM 40 MILLIGRAM(S): 80 TABLET, FILM COATED ORAL at 22:03

## 2022-09-02 NOTE — DISCHARGE NOTE PROVIDER - HOSPITAL COURSE
HPI: This is an 83y/o AA female with Known implantable device PPM/AICD [place] [employer] implant COVID 19 negative with Vaccination Pfizer x 3 doses with PMHX CHF, HTN ,AICD, Glaucoma, Bilateral Knee pain and Acute vestibular syndrome. Pt seen by Cardiologist Dr. Abreu and suggested Nuclear Stress test . Pt had recent abnormal stress test on 8/18/22 which revealed small apical inferior mid inferior reversible perfusion defect visualized in one view which may be suggestive fo a small area of ischemia. LVEF 68% normal LV function . Presents for [facility] today with Dr Edward preop knee sx clearance . No acute distress noted. Currently no acute distress noted .    (02 Sep 2022 09:22)   HPI: This is an 83y/o AA female with Known implantable device PPM/AICD [place] [employer] implant COVID 19 negative with Vaccination Pfizer x 3 doses with PMHX CHF with recovered LVEF 61% on TTE 3/2022, HTN ,AICD, Glaucoma, Bilateral Knee pain and Acute vestibular syndrome. Pt seen by Cardiologist Dr. Abreu and suggested Nuclear Stress test . Pt had recent abnormal stress test on 8/18/22 which revealed small apical inferior mid inferior reversible perfusion defect visualized in one view which may be suggestive fo a small area of ischemia. LVEF 68% normal LV function . Presents for [facility] today with Dr Edward preop knee sx clearance . No acute distress noted. Currently no acute distress noted .    (02 Sep 2022 09:22)    9/2 s/p C diagnostic cath via RRA, site without hematoma/bleeding. Patient without complaint, hemodynamically stable overnight. Pending discharge in AM pending BP    9/3 Pt with improved and more controlled BP, with some dizziness this morning and some lightheadedness is s/p dose of Meclizine with improvement in her symptoms with negative orthostatic vital signs.  She is walking with assistance with her rollator due to her need for knee surgery.  She is stable for d/c home today.

## 2022-09-02 NOTE — ASU PATIENT PROFILE, ADULT - FALL HARM RISK - RISK INTERVENTIONS

## 2022-09-02 NOTE — DISCHARGE NOTE PROVIDER - NSDCMRMEDTOKEN_GEN_ALL_CORE_FT
aspirin 81 mg oral tablet, chewable: 1 tab(s) orally once a day  Coreg 25 mg oral tablet: 1 tab(s) orally 2 times a day  enalapril 20 mg oral tablet: 1 tab(s) orally once a day  fluticasone 50 mcg/inh nasal spray: 1 spray(s) nasal once a day  gabapentin 300 mg oral capsule: 1 cap(s) orally once a day  Lasix 40 mg oral tablet: 1 tab(s) orally once a day  levothyroxine 125 mcg (0.125 mg) oral tablet: 1 tab(s) orally once a day  meclizine 25 mg oral tablet: 1 tab(s) orally every 6 hours, As needed, Dizziness  pravastatin 40 mg oral tablet: 1 tab(s) orally once a day

## 2022-09-02 NOTE — ASU DISCHARGE PLAN (ADULT/PEDIATRIC) - ASU DC SPECIAL INSTRUCTIONSFT
Wound Care:   the day AFTER your procedure remove bandage GENTTLY, and clean using  mild soap and gentle warm, water stream, pat dry. leave OPEN to air. YOU MAY SHOWER   DO NOT apply lotions, creams, ointments, powder, parfumes to your incision site  DO NOT SOAK your site for 1 week ( no baths, no pools, no tubs, etc...)  Check  your groin and /or wirst daily.A small amount of bruising, and soarness are normal    ACTIVITY: for 24 hours   - DO NOT DRIVE  - DO NOT make any important decisions or sign legal documents   - DO NOT operate heavy machinaries   - you may resume sexual activity in 48 hours, unless otherwise instructed by your cardiologist     If your procedure was done through the WRIST: for the NEXT 3DAYS:  - avoid pushing, pulling, with that affected wrist   - avoid repeated movement of that hand and wrist ( eg: typing, hammering)  - DO NOT LIFT anything more than 5 lbs     If your procedure was done through the GROIN: for the NEXT 5 DAYS  - Limit climbing stairs, DO NOT soak in bathtub or pool  - no strenous activities, pushing, pulling, straining  - Do not lift anything 10lbs or heavier     MEDICATION:   take your medications as explained ( see discharge paperwork)   If you received a STENT, you will be taking antiplatelet medications to KEEP YOUR STENT OPEN ( eg: Aspirin, Plavix, Brilinta, Effient, etc).  Take as prescribed DO NOT STOP taking them without consulting with your cardiologist first.     Follow heart healthy diet reccomended by your doctor, , if you smoke STOP SMOKING ( may call [phone] for center of tobacco control if you need assistance)     CALL your doctor to make appointment in 2 WEEKS     ***CALL YOUR DOCTOR***  if you experience: fever, chills, body aches, or severe pain, swelling, redness, heat or yellow discharge at incision site  If you experience Bleeding or excruciating pain at the procedural site, sweliing ( golf ball size) at your procedural site  If you experience CHEST PAIN  If you experience extremity numbness, tingling, temperature change ( of your procedural site)   If you are unable to reach your doctor, you may contact:   -Cardiology Office at [facility] at [phone] or   - [facility] [phone] - [facility] [phone]

## 2022-09-02 NOTE — H&P CARDIOLOGY - NSICDXPASTMEDICALHX_GEN_ALL_CORE_FT
PAST MEDICAL HISTORY:  Acute on chronic systolic congestive heart failure     Arthritis     Obesity     Pacemaker     S/P ICD (internal cardiac defibrillator) procedure

## 2022-09-02 NOTE — ASU DISCHARGE PLAN (ADULT/PEDIATRIC) - CARE PROVIDER_API CALL
Lambert Abreu  [facility] CARDIOLOGY  [address], [address]  [place] [address]  Phone: [phone]  Fax: [phone]  Follow Up Time: Routine

## 2022-09-02 NOTE — DISCHARGE NOTE PROVIDER - CARE PROVIDER_API CALL
Lambert Abreu  [facility] CARDIOLOGY  [address], [address]  [place] [address]  Phone: [phone]  Fax: [phone]  Established Patient  Follow Up Time: 2 weeks

## 2022-09-02 NOTE — DISCHARGE NOTE PROVIDER - NSDCCPCAREPLAN_GEN_ALL_CORE_FT
PRINCIPAL DISCHARGE DIAGNOSIS  Diagnosis: HTN (hypertension)  Assessment and Plan of Treatment: Continue with your blood pressure medications; eat a heart healthy diet with low salt diet; exercise regularly (consult with your physician or cardiologist first); maintain a heart healthy weight; if you smoke - quit (A resource to help you stop smoking is the [facility] [employer] for [employer] Control – phone number [phone].); include healthy ways to manage stress. Continue to follow with your primary care physician or cardiologist.      SECONDARY DISCHARGE DIAGNOSES  Diagnosis: Acute on chronic systolic congestive heart failure  Assessment and Plan of Treatment: Take your medications as prescribed. Follow a  low-salt,  low cholesterol heart healthy diet. Weigh yourself every day; call your doctor if you gain 2 pounds over one to two days or 3 pounds over three days. Get to or maintain a healthy weight; ask your heart failure team for referrals to a registered dietitian if needed. Be active (check with your physician or cardiologist first). Find healthy ways to deal with stress, such as deep breathing, meditation, exercise, and doing hobbies that you enjoy. If you smoke, quit. (A resource to help you stop smoking is the [facility] [employer] for Tobacco Control – phone number [phone].).     PRINCIPAL DISCHARGE DIAGNOSIS  Diagnosis: HTN (hypertension)  Assessment and Plan of Treatment: Continue with your blood pressure medications; eat a heart healthy diet with low salt diet; exercise regularly (consult with your physician or cardiologist first); maintain a heart healthy weight; if you smoke - quit (A resource to help you stop smoking is the [facility] for [employer] Control – phone number [phone].); include healthy ways to manage stress. Continue to follow with your primary care physician or cardiologist.      SECONDARY DISCHARGE DIAGNOSES  Diagnosis: Acute on chronic systolic congestive heart failure  Assessment and Plan of Treatment: Take your medications as prescribed. Follow a  low-salt,  low cholesterol heart healthy diet. Weigh yourself every day; call your doctor if you gain 2 pounds over one to two days or 3 pounds over three days. Get to or maintain a healthy weight; ask your heart failure team for referrals to a registered dietitian if needed. Be active (check with your physician or cardiologist first). Find healthy ways to deal with stress, such as deep breathing, meditation, exercise, and doing hobbies that you enjoy. If you smoke, quit. (A resource to help you stop smoking is the [facility] for Tobacco Control – phone number [phone].).    Diagnosis: Vertigo  Assessment and Plan of Treatment: Take your meclizine when you have an episode to offset events

## 2022-09-02 NOTE — DISCHARGE NOTE PROVIDER - NSDCFUADDINST_GEN_ALL_CORE_FT

## 2022-09-02 NOTE — ASU DISCHARGE PLAN (ADULT/PEDIATRIC) - NS MD DC FALL RISK RISK
For information on Fall & Injury Prevention, visit: [email or web address] OR  [email or web address] OR  https://www.cdc.gov/steadi/patient.html

## 2022-09-02 NOTE — H&P CARDIOLOGY - HISTORY OF PRESENT ILLNESS
This is an 81y/o AA female with Known implantable device PPM/AICD [place] [employer] implant COVID 19 negative with Vaccination Pfizer x 3 doses with PMHX CHF ,AICD,Glaucoma, Bilateral Knee pain and Acute vestibular syndrome. Pt seen by Cardiologist Dr. Abreu and suggested Nuclear Stress test . Pt had recent abnormal stress test on 8/18/22 which revealed small apical inferior mid inferior reversible perfusion defect visualized in one view which may be suggestive fo a small area of ischemia. LVEF 68% normal LV function . Presents for [facility] today with Dr Edward preop knee sx clearance . No acute distress noted. Currently no acute distress noted .    This is an 81y/o AA female with Known implantable device PPM/AICD [place] [employer] implant COVID 19 negative with Vaccination Pfizer x 3 doses with PMHX CHF, HTN ,AICD,Glaucoma, Bilateral Knee pain and Acute vestibular syndrome. Pt seen by Cardiologist Dr. Abreu and suggested Nuclear Stress test . Pt had recent abnormal stress test on 8/18/22 which revealed small apical inferior mid inferior reversible perfusion defect visualized in one view which may be suggestive fo a small area of ischemia. LVEF 68% normal LV function . Presents for [facility] today with Dr Edward preop knee sx clearance . No acute distress noted. Currently no acute distress noted .

## 2022-09-02 NOTE — CHART NOTE - NSCHARTNOTEFT_GEN_A_CORE
Patient seen and examined called by RN that patient with uncontrolled blood pressure. Patient had SBP of 198 , She was given labetalol 10mg IVx 2 . She was also given amlodipine PO 5mg and lisinopril PO. She did not take her PO medications this morning. Hydralzine 20mg Givenand BP improved to 165/78 . Will admit patient and monitor overnight .     165/78     84     16    Gen - NAD , A&0 x 3 , ambulated to bathroom steady gait.   Lungs - CTA b/l  Heart - Reg S1, S2  Neuro- EOMs intact, equal strength in all extremeties , no focal neuro deficit      AP 81 yo M with PMhx Hx , HTN, chf, AICD,  glaucoma, AICD S/P DIAGNOSTIC cath with normal coronary arteries   - will admit patient overnight for blood pressure monitoring   - resume am meds  - Dr Edward made aware of above .

## 2022-09-03 ENCOUNTER — TRANSCRIPTION ENCOUNTER (OUTPATIENT)
Age: 82
End: 2022-09-03

## 2022-09-03 VITALS
OXYGEN SATURATION: 97 % | SYSTOLIC BLOOD PRESSURE: 151 MMHG | RESPIRATION RATE: 13 BRPM | HEART RATE: 82 BPM | DIASTOLIC BLOOD PRESSURE: 85 MMHG | TEMPERATURE: 98 F

## 2022-09-03 PROCEDURE — 85027 COMPLETE CBC AUTOMATED: CPT

## 2022-09-03 PROCEDURE — C1894: CPT

## 2022-09-03 PROCEDURE — C1887: CPT

## 2022-09-03 PROCEDURE — 80048 BASIC METABOLIC PNL TOTAL CA: CPT

## 2022-09-03 PROCEDURE — 93454 CORONARY ARTERY ANGIO S&I: CPT

## 2022-09-03 PROCEDURE — C1769: CPT

## 2022-09-03 RX ADMIN — Medication 81 MILLIGRAM(S): at 05:33

## 2022-09-03 RX ADMIN — CARVEDILOL PHOSPHATE 25 MILLIGRAM(S): 80 CAPSULE, EXTENDED RELEASE ORAL at 05:32

## 2022-09-03 RX ADMIN — Medication 20 MILLIGRAM(S): at 05:33

## 2022-09-03 RX ADMIN — Medication 125 MICROGRAM(S): at 05:32

## 2022-09-03 RX ADMIN — Medication 25 MILLIGRAM(S): at 05:32

## 2022-09-03 NOTE — DISCHARGE NOTE NURSING/CASE MANAGEMENT/SOCIAL WORK - NSDCPEFALRISK_GEN_ALL_CORE
For information on Fall & Injury Prevention, visit: [email or web address] OR  [email or web address] OR  https://www.cdc.gov/steadi/patient.html

## 2022-09-03 NOTE — PROGRESS NOTE ADULT - ASSESSMENT
This is an 81y/o AA female with Known implantable device PPM/AICD [place] [employer] implant COVID 19 negative with Vaccination Pfizer x 3 doses with PMHX CHF, HTN ,AICD,Glaucoma, Bilateral Knee pain and Acute vestibular syndrome. Pt seen by Cardiologist Dr. Abreu and suggested Nuclear Stress test . Pt had recent abnormal stress test on 8/18/22 which revealed small apical inferior mid inferior reversible perfusion defect visualized in one view which may be suggestive fo a small area of ischemia. LVEF 68% normal LV function . Presents for [facility] today with Dr Edward preop knee sx clearance . No acute distress noted. Currently no acute distress noted .       s/p cardiac cath via RRA on 9/2/22 with no stent placed      Right wrist stable w/o bleeding or hematoma; site soft, non tender.  Right radial pulse palpable +2.  Denies chest pain, denies right wrist/arm/hand:  pain, numbness, or tingling       - Reviewed and reinforced with patient:  wound care instructions, activities dos and donts  - Reviewed and reinforced with patient:  site complications ( eg: bleeding, excruciating pain at the procedural site, large swelling-golf ball size-  extremity numbness, tingling, temperature change), or CHEST PAIN; pt aware that if any of those occur he/she must call cardiologist IMMEDIATELY or 911 or go to nearest emergency room   - Reviewed and reinforced a heart healthy diet  - Patient verbalizes understanding of ALL OF THE ABOVE, and gives positive feedback       #CHF  Follow a  low-salt,  low cholesterol heart healthy diet.   carvedilol 25 milliGRAM(s) Oral every 12 hours  enalapril 20 milliGRAM(s) Oral daily    #HTN  cont antihypertensives (carvedilol 25 milliGRAM(s) Oral every 12 hours  enalapril 20 milliGRAM(s) Oral daily)  cont statin   Keep Mg >2 K >4  f/u appt in 2 weeks post dc with oupt cardiologist  for all  general cardiology questions please contact patient's primary cards team   all other care as per primary medicine  team                This is an 83y/o AA female with Known implantable device PPM/AICD [place] [employer] implant COVID 19 negative with Vaccination Pfizer x 3 doses with PMHX CHF, HTN ,AICD,Glaucoma, Bilateral Knee pain and Acute vestibular syndrome. Pt seen by Cardiologist Dr. Abreu and suggested Nuclear Stress test . Pt had recent abnormal stress test on 8/18/22 which revealed small apical inferior mid inferior reversible perfusion defect visualized in one view which may be suggestive fo a small area of ischemia. LVEF 68% normal LV function . Presents for [facility] today with Dr Edward preonur knee sx clearance . No acute distress noted. Currently no acute distress noted .       s/p cardiac cath via RRA on 9/2/22 with no stent placed      Right wrist stable w/o bleeding or hematoma; site soft, non tender.  Right radial pulse palpable +2.  Denies chest pain, denies right wrist/arm/hand:  pain, numbness, or tingling       - Reviewed and reinforced with patient:  wound care instructions, activities dos and donts  - Reviewed and reinforced with patient:  site complications ( eg: bleeding, excruciating pain at the procedural site, large swelling-golf ball size-  extremity numbness, tingling, temperature change), or CHEST PAIN; pt aware that if any of those occur he/she must call cardiologist IMMEDIATELY or 911 or go to nearest emergency room   - Reviewed and reinforced a heart healthy diet  - Patient verbalizes understanding of ALL OF THE ABOVE, and gives positive feedback       #CHF  Follow a  low-salt,  low cholesterol heart healthy diet.   carvedilol 25 milliGRAM(s) Oral every 12 hours  enalapril 20 milliGRAM(s) Oral daily    #HTN  cont antihypertensives (carvedilol 25 milliGRAM(s) Oral every 12 hours  enalapril 20 milliGRAM(s) Oral daily)      #HLD  cont statin   lipid profile  DASH diet       Other recommendations:   Keep Mg >2 K >4  f/u appt in 2 weeks post dc with oupt cardiologist  for all  general cardiology questions please contact patient's primary cards team   all other care as per primary medicine  team         DAILY Lassiter  Interventional Cardiology

## 2022-09-03 NOTE — DISCHARGE NOTE NURSING/CASE MANAGEMENT/SOCIAL WORK - PATIENT PORTAL LINK FT
You can access the FollowMyHealth Patient Portal offered by [facility] by registering at the following website: [email or web address]. By joining [facility]’s FollowMyHealth portal, you will also be able to view your health information using other applications (apps) compatible with our system.

## 2022-09-03 NOTE — PROGRESS NOTE ADULT - SUBJECTIVE AND OBJECTIVE BOX
[facility] INVASIVE CARDIOLOGY- (Gerson Boss, Wagner, Avelino, Demetrio, Gwen, Diony, Rayshawn, Emmanuel)   CARDIAC CATH LAB, [facility] TEAM   [phone]      CHIEF COMPLAINT: Patient is a 82y old  Female who presents with a chief complaint of + stress test (02 Sep 2022 22:36)      HPI:  This is an 81y/o AA female with Known implantable device PPM/AICD [employer] implant COVID 19 negative with Vaccination Pfizer x 3 doses with PMHX CHF, HTN ,AICD,Glaucoma, Bilateral Knee pain and Acute vestibular syndrome. Pt seen by Cardiologist Dr. Abreu and suggested Nuclear Stress test . Pt had recent abnormal stress test on 8/18/22 which revealed small apical inferior mid inferior reversible perfusion defect visualized in one view which may be suggestive fo a small area of ischemia. LVEF 68% normal LV function . Presents for [facility] today with Dr Edward preop knee sx clearance . No acute distress noted. Currently no acute distress noted .           Subjective/Observations: patient seen and examined.  denies chest pain, dyspena, dizziness, palpitations, N&V, HA      Review of Systems all WNL except below indicated:    Constitutional: [ ] Fever [ ] Chills [ ] Fatigue [ ] Weight change   HEENT: [ ] Blurred vision [ ] Eye Pain [ ] Headache [ ] Runny nose [ ] Sore Throat   Respiratory: [ ] Cough [ ] Wheezing [ ] Shortness of breath  Cardiovascular: [ ] Chest Pain [ ] Palpitations [ ] CRISTOBAL [ ] PND [ ] Orthopnea  Gastrointestinal: [ ] Abdominal Pain [ ] Diarrhea [ ] Constipation [ ] Hemorrhoids [ ] Nausea [ ] Vomiting  Genitourinary: [ ] Nocturia [ ] Dysuria [ ] Incontinence  Extremities: [ ] Swelling [ ] Joint Pain  Neurologic: [ ] Focal deficit [ ] Paresthesias [ ] Syncope  Lymphatic: [ ] Swelling [ ] Lymphadenopathy   Skin: [ ] Rash [ ] Ecchymoses [ ] Wounds [ ] Lesions  Psychiatry: [ ] Depression [ ] Suicidal/Homicidal Ideation [ ] Anxiety [ ] Sleep Disturbances  [x] 10 point review of systems is otherwise negative except as mentioned above            [ ]Unable to obtain    PAST MEDICAL & SURGICAL HISTORY:  Acute on chronic systolic congestive heart failure      Arthritis      Obesity      Pacemaker      S/P ICD (internal cardiac defibrillator) procedure          MEDICATIONS  (STANDING):  aspirin  chewable 81 milliGRAM(s) Oral daily  atorvastatin 40 milliGRAM(s) Oral at bedtime  carvedilol 25 milliGRAM(s) Oral every 12 hours  enalapril 20 milliGRAM(s) Oral daily  gabapentin 300 milliGRAM(s) Oral daily  levothyroxine 125 MICROGram(s) Oral daily    MEDICATIONS  (PRN):  meclizine 25 milliGRAM(s) Oral every 6 hours PRN Dizziness      Allergies    No Known Allergies    Intolerances          Vital Signs Last 24 Hrs  T(C): 36.6 (02 Sep 2022 21:17), Max: 36.6 (02 Sep 2022 09:18)  T(F): 97.8 (02 Sep 2022 21:17), Max: 97.8 (02 Sep 2022 09:18)  HR: 99 (02 Sep 2022 21:17) (61 - 99)  BP: 145/70 (02 Sep 2022 21:17) (145/70 - 198/94)  BP(mean): 90 (02 Sep 2022 21:17) (90 - 108)  RR: 19 (02 Sep 2022 21:17) (16 - 19)  SpO2: 96% (02 Sep 2022 21:17) (95% - 98%)    Parameters below as of 02 Sep 2022 21:17  Patient On (Oxygen Delivery Method): room air        I&O's Summary    02 Sep 2022 07:01  -  03 Sep 2022 01:08  --------------------------------------------------------  IN: 0 mL / OUT: 1400 mL / NET: -1400 mL      Weight (kg): 113.4 (09-02 @ 09:22)    FOCUSED PHYSICAL EXAM:  Pulmonary: Non-labored, breath sounds are clear bilaterally, No wheezing, rales or rhonchi  Cardiovascular: Regular, S1 and S2, No murmurs, rubs, gallops or clicks  cath site: Right radial stable w/o bleeding or hematoma, soft, + pulses     LABS: All Labs Reviewed:                        12.3   3.35  )-----------( 94       ( 02 Sep 2022 09:40 )             37.5     02 Sep 2022 09:40    138    |  101    |  15     ----------------------------<  113    4.1     |  27     |  0.94     Ca    9.5        02 Sep 2022 09:40            RESULTS:  TELE intepretation:     ECG: atrial sensed ventricular paced rhythm at 73 bpm    CATH REPORT: 9/2/22  Diagnostic Conclusions:   The coronary anatomy is normal                 [facility] INVASIVE CARDIOLOGY- (Gerson Boss, Wagner, Avelino, Demetrio, Gwen, Diony, Rayshawn, Emmanuel)   CARDIAC CATH LAB, [facility] TEAM   [phone]      CHIEF COMPLAINT: Patient is a 82y old  Female who presents with a chief complaint of + stress test (02 Sep 2022 22:36)      HPI:  This is an 81y/o AA female with Known implantable device PPM/AICD [employer] implant COVID 19 negative with Vaccination Pfizer x 3 doses with PMHX CHF, HTN ,AICD,Glaucoma, Bilateral Knee pain and Acute vestibular syndrome. Pt seen by Cardiologist Dr. Abreu and suggested Nuclear Stress test . Pt had recent abnormal stress test on 8/18/22 which revealed small apical inferior mid inferior reversible perfusion defect visualized in one view which may be suggestive fo a small area of ischemia. LVEF 68% normal LV function . Presents for [facility] today with Dr Edward preop knee sx clearance . No acute distress noted. Currently no acute distress noted .           Subjective/Observations: patient seen and examined.  denies chest pain, dyspena, dizziness, palpitations, N&V, HA      Review of Systems all WNL except below indicated:    Constitutional: [ ] Fever [ ] Chills [ ] Fatigue [ ] Weight change   HEENT: [ ] Blurred vision [ ] Eye Pain [ ] Headache [ ] Runny nose [ ] Sore Throat   Respiratory: [ ] Cough [ ] Wheezing [ ] Shortness of breath  Cardiovascular: [ ] Chest Pain [ ] Palpitations [ ] CRISTOBAL [ ] PND [ ] Orthopnea  Gastrointestinal: [ ] Abdominal Pain [ ] Diarrhea [ ] Constipation [ ] Hemorrhoids [ ] Nausea [ ] Vomiting  Genitourinary: [ ] Nocturia [ ] Dysuria [ ] Incontinence  Extremities: [ ] Swelling [ ] Joint Pain  Neurologic: [ ] Focal deficit [ ] Paresthesias [ ] Syncope  Lymphatic: [ ] Swelling [ ] Lymphadenopathy   Skin: [ ] Rash [ ] Ecchymoses [ ] Wounds [ ] Lesions  Psychiatry: [ ] Depression [ ] Suicidal/Homicidal Ideation [ ] Anxiety [ ] Sleep Disturbances  [x] 10 point review of systems is otherwise negative except as mentioned above            [ ]Unable to obtain    PAST MEDICAL & SURGICAL HISTORY:  Acute on chronic systolic congestive heart failure      Arthritis      Obesity      Pacemaker      S/P ICD (internal cardiac defibrillator) procedure          MEDICATIONS  (STANDING):  aspirin  chewable 81 milliGRAM(s) Oral daily  atorvastatin 40 milliGRAM(s) Oral at bedtime  carvedilol 25 milliGRAM(s) Oral every 12 hours  enalapril 20 milliGRAM(s) Oral daily  gabapentin 300 milliGRAM(s) Oral daily  levothyroxine 125 MICROGram(s) Oral daily    MEDICATIONS  (PRN):  meclizine 25 milliGRAM(s) Oral every 6 hours PRN Dizziness      Allergies    No Known Allergies    Intolerances          Vital Signs Last 24 Hrs  T(C): 36.6 (02 Sep 2022 21:17), Max: 36.6 (02 Sep 2022 09:18)  T(F): 97.8 (02 Sep 2022 21:17), Max: 97.8 (02 Sep 2022 09:18)  HR: 99 (02 Sep 2022 21:17) (61 - 99)  BP: 145/70 (02 Sep 2022 21:17) (145/70 - 198/94)  BP(mean): 90 (02 Sep 2022 21:17) (90 - 108)  RR: 19 (02 Sep 2022 21:17) (16 - 19)  SpO2: 96% (02 Sep 2022 21:17) (95% - 98%)    Parameters below as of 02 Sep 2022 21:17  Patient On (Oxygen Delivery Method): room air        I&O's Summary    02 Sep 2022 07:01  -  03 Sep 2022 01:08  --------------------------------------------------------  IN: 0 mL / OUT: 1400 mL / NET: -1400 mL      Weight (kg): 113.4 (09-02 @ 09:22)    FOCUSED PHYSICAL EXAM:  Pulmonary: Non-labored, breath sounds are clear bilaterally, No wheezing, rales or rhonchi  Cardiovascular: Regular, S1 and S2, No murmurs, rubs, gallops or clicks  cath site: Right radial stable w/o bleeding or hematoma, soft, + pulses     LABS: All Labs Reviewed:                        12.3   3.35  )-----------( 94       ( 02 Sep 2022 09:40 )             37.5     02 Sep 2022 09:40    138    |  101    |  15     ----------------------------<  113    4.1     |  27     |  0.94     Ca    9.5        02 Sep 2022 09:40            RESULTS:  TELE intepretation: no events to report     ECG: atrial sensed ventricular paced rhythm at 73 bpm    CATH REPORT: 9/2/22  Diagnostic Conclusions:   The coronary anatomy is normal

## 2022-09-23 PROBLEM — I50.23 ACUTE ON CHRONIC SYSTOLIC (CONGESTIVE) HEART FAILURE: Chronic | Status: ACTIVE | Noted: 2022-09-02

## 2022-09-23 PROBLEM — M19.90 UNSPECIFIED OSTEOARTHRITIS, UNSPECIFIED SITE: Chronic | Status: ACTIVE | Noted: 2022-09-02

## 2022-09-23 PROBLEM — Z95.810 PRESENCE OF AUTOMATIC (IMPLANTABLE) CARDIAC DEFIBRILLATOR: Chronic | Status: ACTIVE | Noted: 2022-09-02

## 2022-09-23 PROBLEM — E66.9 OBESITY, UNSPECIFIED: Chronic | Status: ACTIVE | Noted: 2022-09-02

## 2022-09-23 PROBLEM — Z95.0 PRESENCE OF CARDIAC PACEMAKER: Chronic | Status: ACTIVE | Noted: 2022-09-02

## 2022-10-19 ENCOUNTER — APPOINTMENT (OUTPATIENT)
Dept: CARDIOLOGY | Facility: CLINIC | Age: 82
End: 2022-10-19

## 2022-11-11 NOTE — ED ADULT NURSE NOTE - CHIEF COMPLAINT QUOTE
Pt c/o dizziness, nausea and multiple episodes of vomiting since tonight. Pt states she was trying to get up to use the bathroom when she became dizzy. pt states it feels like the room is spinning  and worse with movement. ..no
normal...

## 2022-12-13 ENCOUNTER — APPOINTMENT (OUTPATIENT)
Dept: OPHTHALMOLOGY | Facility: CLINIC | Age: 82
End: 2022-12-13

## 2022-12-23 NOTE — PHYSICAL EXAM
Office Visit Chart Prep  Liliana Condon is scheduled to see Shana Anderson MD on 1/30/2023.    The primary care provider/referring provider is Ping Vail DO and the patient is being seen for dyslipidemia  The last appointment was 7/18/22 with Dr Anderson at [facility] [place] 2] and the recommendations were:    RECOMMENDATIONS:  1. New diet and exercise goals reviewed today. (see below)  2. Metformin: continue 500mg in AM and 1000mg in PM.  We will try extended release capsules to see if this is easier to swallow.  3. Return to [facility] in 6 months with fasting lipid profile, CMP, insulin, and HgbA1c at least 1-2 weeks prior to visit.        Does this encounter need to be followed up on? Yes routed to ensure labs are completed 1/24/23         [General Appearance - Well Developed] : well developed [Normal Appearance] : normal appearance [Well Groomed] : well groomed [General Appearance - Well Nourished] : well nourished [No Deformities] : no deformities [General Appearance - In No Acute Distress] : no acute distress [Normal Conjunctiva] : the conjunctiva exhibited no abnormalities [Eyelids - No Xanthelasma] : the eyelids demonstrated no xanthelasmas [Normal Oral Mucosa] : normal oral mucosa [No Oral Pallor] : no oral pallor [No Oral Cyanosis] : no oral cyanosis [Normal Jugular Venous A Waves Present] : normal jugular venous A waves present [Normal Jugular Venous V Waves Present] : normal jugular venous V waves present [No Jugular Venous Singh A Waves] : no jugular venous singh A waves [Normal Rate] : normal [Normal S1] : normal S1 [Normal S2] : normal S2 [No Murmur] : no murmurs heard [2+] : left 2+ [No Abnormalities] : the abdominal aorta was not enlarged and no bruit was heard [No Pitting Edema] : no pitting edema present [Respiration, Rhythm And Depth] : normal respiratory rhythm and effort [Exaggerated Use Of Accessory Muscles For Inspiration] : no accessory muscle use [Auscultation Breath Sounds / Voice Sounds] : lungs were clear to auscultation bilaterally [Abdomen Soft] : soft [Abdomen Tenderness] : non-tender [Abdomen Mass (___ Cm)] : no abdominal mass palpated [Abnormal Walk] : normal gait [Gait - Sufficient For Exercise Testing] : the gait was sufficient for exercise testing [Nail Clubbing] : no clubbing of the fingernails [Cyanosis, Localized] : no localized cyanosis [Petechial Hemorrhages (___cm)] : no petechial hemorrhages [Skin Color & Pigmentation] : normal skin color and pigmentation [] : no rash [No Venous Stasis] : no venous stasis [Skin Lesions] : no skin lesions [No Skin Ulcers] : no skin ulcer [No Xanthoma] : no  xanthoma was observed [Oriented To Time, Place, And Person] : oriented to person, place, and time [Affect] : the affect was normal [Mood] : the mood was normal [No Anxiety] : not feeling anxious [S3] : no S3 [S4] : no S4 [Right Carotid Bruit] : no bruit heard over the right carotid [Left Carotid Bruit] : no bruit heard over the left carotid [Right Femoral Bruit] : no bruit heard over the right femoral artery [Left Femoral Bruit] : no bruit heard over the left femoral artery

## 2022-12-28 ENCOUNTER — APPOINTMENT (OUTPATIENT)
Dept: OPHTHALMOLOGY | Facility: CLINIC | Age: 82
End: 2022-12-28
Payer: MEDICARE

## 2022-12-28 ENCOUNTER — NON-APPOINTMENT (OUTPATIENT)
Age: 82
End: 2022-12-28

## 2022-12-28 PROCEDURE — 92020 GONIOSCOPY: CPT

## 2022-12-28 PROCEDURE — 92133 CPTRZD OPH DX IMG PST SGM ON: CPT

## 2022-12-28 PROCEDURE — 99214 OFFICE O/P EST MOD 30 MIN: CPT

## 2023-01-05 ENCOUNTER — NON-APPOINTMENT (OUTPATIENT)
Age: 83
End: 2023-01-05

## 2023-01-05 ENCOUNTER — APPOINTMENT (OUTPATIENT)
Dept: OPHTHALMOLOGY | Facility: CLINIC | Age: 83
End: 2023-01-05
Payer: MEDICARE

## 2023-01-05 PROCEDURE — 92083 EXTENDED VISUAL FIELD XM: CPT

## 2023-01-05 PROCEDURE — 92012 INTRM OPH EXAM EST PATIENT: CPT

## 2023-01-05 PROCEDURE — 92020 GONIOSCOPY: CPT

## 2023-01-25 ENCOUNTER — APPOINTMENT (OUTPATIENT)
Dept: CARDIOLOGY | Facility: CLINIC | Age: 83
End: 2023-01-25
Payer: MEDICARE

## 2023-01-25 PROCEDURE — 93288 INTERROG EVL PM/LDLS PM IP: CPT

## 2023-01-25 NOTE — PROCEDURE
[CRT-D] : Cardiac resynchronization therapy defibrillator [DDD] : DDD [Longevity: ___ months] : The estimated remaining battery life is [unfilled] months [Lead Imp:  ___ohms] : lead impedance was [unfilled] ohms [Sensing Amplitude ___mv] : sensing amplitude was [unfilled] mv [___V @] : [unfilled] V [___ ms] : [unfilled] ms [de-identified] : BIV PACED [de-identified] : [facility] [de-identified] : N160 [de-identified] : 3/7/2013 [de-identified] :  [de-identified] : APACED 3%\par VPACED 99%\par 2 episodes of atach 170 bpm 8 beats the longest available\par 1 episode of probable af 9 seconds  120 8/29/2022\par no therapies

## 2023-03-09 ENCOUNTER — NON-APPOINTMENT (OUTPATIENT)
Age: 83
End: 2023-03-09

## 2023-03-09 ENCOUNTER — APPOINTMENT (OUTPATIENT)
Dept: OPHTHALMOLOGY | Facility: CLINIC | Age: 83
End: 2023-03-09
Payer: MEDICARE

## 2023-03-09 PROCEDURE — 92020 GONIOSCOPY: CPT

## 2023-03-09 PROCEDURE — 99214 OFFICE O/P EST MOD 30 MIN: CPT

## 2023-03-29 ENCOUNTER — APPOINTMENT (OUTPATIENT)
Dept: OPHTHALMOLOGY | Facility: CLINIC | Age: 83
End: 2023-03-29

## 2023-04-03 ENCOUNTER — NON-APPOINTMENT (OUTPATIENT)
Age: 83
End: 2023-04-03

## 2023-04-03 ENCOUNTER — APPOINTMENT (OUTPATIENT)
Dept: OPHTHALMOLOGY | Facility: CLINIC | Age: 83
End: 2023-04-03
Payer: MEDICARE

## 2023-04-03 PROCEDURE — 92012 INTRM OPH EXAM EST PATIENT: CPT

## 2023-05-11 ENCOUNTER — APPOINTMENT (OUTPATIENT)
Dept: OPHTHALMOLOGY | Facility: CLINIC | Age: 83
End: 2023-05-11

## 2023-07-26 ENCOUNTER — APPOINTMENT (OUTPATIENT)
Dept: CARDIOLOGY | Facility: CLINIC | Age: 83
End: 2023-07-26
Payer: MEDICARE

## 2023-07-26 DIAGNOSIS — I50.9 HEART FAILURE, UNSPECIFIED: ICD-10-CM

## 2023-07-26 PROCEDURE — 93289 INTERROG DEVICE EVAL HEART: CPT

## 2023-07-26 NOTE — PROCEDURE
[CRT-D] : Cardiac resynchronization therapy defibrillator [DDD] : DDD [Longevity: ___ months] : The estimated remaining battery life is [unfilled] months [Lead Imp:  ___ohms] : lead impedance was [unfilled] ohms [Sensing Amplitude ___mv] : sensing amplitude was [unfilled] mv [___V @] : [unfilled] V [___ ms] : [unfilled] ms [de-identified] : BIV PACED [de-identified] : [facility] [de-identified] : N160 [de-identified] : 3/7/2013 [de-identified] :  [de-identified] : APACED 8%\par VPACED 100%\par 2 episodes of atach 180 bpm 4 beats the longest available\par no therapies

## 2024-03-20 ENCOUNTER — APPOINTMENT (OUTPATIENT)
Dept: CARDIOLOGY | Facility: CLINIC | Age: 84
End: 2024-03-20
Payer: MEDICARE

## 2024-03-20 ENCOUNTER — INPATIENT (INPATIENT)
Facility: HOSPITAL | Age: 84
LOS: 5 days | Discharge: HOME HEALTH | DRG: 245 | End: 2024-03-26
Attending: INTERNAL MEDICINE | Admitting: INTERNAL MEDICINE
Payer: COMMERCIAL

## 2024-03-20 ENCOUNTER — NON-APPOINTMENT (OUTPATIENT)
Age: 84
End: 2024-03-20

## 2024-03-20 VITALS
HEART RATE: 73 BPM | HEIGHT: 67 IN | OXYGEN SATURATION: 95 % | RESPIRATION RATE: 20 BRPM | WEIGHT: 251.99 LBS | TEMPERATURE: 98 F | DIASTOLIC BLOOD PRESSURE: 81 MMHG | SYSTOLIC BLOOD PRESSURE: 161 MMHG

## 2024-03-20 DIAGNOSIS — Z95.810 PRESENCE OF AUTOMATIC (IMPLANTABLE) CARDIAC DEFIBRILLATOR: ICD-10-CM

## 2024-03-20 DIAGNOSIS — R42 DIZZINESS AND GIDDINESS: ICD-10-CM

## 2024-03-20 DIAGNOSIS — Z45.010 ENCOUNTER FOR CHECKING AND TESTING OF CARDIAC PACEMAKER PULSE GENERATOR [BATTERY]: ICD-10-CM

## 2024-03-20 DIAGNOSIS — I10 ESSENTIAL (PRIMARY) HYPERTENSION: ICD-10-CM

## 2024-03-20 DIAGNOSIS — I50.32 CHRONIC DIASTOLIC (CONGESTIVE) HEART FAILURE: ICD-10-CM

## 2024-03-20 DIAGNOSIS — D69.6 THROMBOCYTOPENIA, UNSPECIFIED: ICD-10-CM

## 2024-03-20 LAB
ALBUMIN SERPL ELPH-MCNC: 4.5 G/DL — SIGNIFICANT CHANGE UP (ref 3.3–5)
ALP SERPL-CCNC: 78 U/L — SIGNIFICANT CHANGE UP (ref 40–120)
ALT FLD-CCNC: 14 U/L — SIGNIFICANT CHANGE UP (ref 10–45)
ANION GAP SERPL CALC-SCNC: 13 MMOL/L — SIGNIFICANT CHANGE UP (ref 5–17)
AST SERPL-CCNC: 25 U/L — SIGNIFICANT CHANGE UP (ref 10–40)
BASOPHILS # BLD AUTO: 0 K/UL — SIGNIFICANT CHANGE UP (ref 0–0.2)
BASOPHILS NFR BLD AUTO: 0 % — SIGNIFICANT CHANGE UP (ref 0–2)
BILIRUB SERPL-MCNC: 0.4 MG/DL — SIGNIFICANT CHANGE UP (ref 0.2–1.2)
BLD GP AB SCN SERPL QL: NEGATIVE — SIGNIFICANT CHANGE UP
BUN SERPL-MCNC: 15 MG/DL — SIGNIFICANT CHANGE UP (ref 7–23)
CALCIUM SERPL-MCNC: 9.9 MG/DL — SIGNIFICANT CHANGE UP (ref 8.4–10.5)
CHLORIDE SERPL-SCNC: 100 MMOL/L — SIGNIFICANT CHANGE UP (ref 96–108)
CO2 SERPL-SCNC: 25 MMOL/L — SIGNIFICANT CHANGE UP (ref 22–31)
CREAT SERPL-MCNC: 0.87 MG/DL — SIGNIFICANT CHANGE UP (ref 0.5–1.3)
EGFR: 66 ML/MIN/1.73M2 — SIGNIFICANT CHANGE UP
EOSINOPHIL # BLD AUTO: 0 K/UL — SIGNIFICANT CHANGE UP (ref 0–0.5)
EOSINOPHIL NFR BLD AUTO: 0 % — SIGNIFICANT CHANGE UP (ref 0–6)
GLUCOSE SERPL-MCNC: 117 MG/DL — HIGH (ref 70–99)
HCT VFR BLD CALC: 39.1 % — SIGNIFICANT CHANGE UP (ref 34.5–45)
HGB BLD-MCNC: 12.9 G/DL — SIGNIFICANT CHANGE UP (ref 11.5–15.5)
LIDOCAIN IGE QN: 31 U/L — SIGNIFICANT CHANGE UP (ref 7–60)
LYMPHOCYTES # BLD AUTO: 1.78 K/UL — SIGNIFICANT CHANGE UP (ref 1–3.3)
LYMPHOCYTES # BLD AUTO: 42.9 % — SIGNIFICANT CHANGE UP (ref 13–44)
MAGNESIUM SERPL-MCNC: 2.1 MG/DL — SIGNIFICANT CHANGE UP (ref 1.6–2.6)
MANUAL SMEAR VERIFICATION: SIGNIFICANT CHANGE UP
MCHC RBC-ENTMCNC: 30.5 PG — SIGNIFICANT CHANGE UP (ref 27–34)
MCHC RBC-ENTMCNC: 33 GM/DL — SIGNIFICANT CHANGE UP (ref 32–36)
MCV RBC AUTO: 92.4 FL — SIGNIFICANT CHANGE UP (ref 80–100)
MONOCYTES # BLD AUTO: 0.26 K/UL — SIGNIFICANT CHANGE UP (ref 0–0.9)
MONOCYTES NFR BLD AUTO: 6.2 % — SIGNIFICANT CHANGE UP (ref 2–14)
NEUTROPHILS # BLD AUTO: 2.12 K/UL — SIGNIFICANT CHANGE UP (ref 1.8–7.4)
NEUTROPHILS NFR BLD AUTO: 50.9 % — SIGNIFICANT CHANGE UP (ref 43–77)
NT-PROBNP SERPL-SCNC: 227 PG/ML — SIGNIFICANT CHANGE UP (ref 0–300)
PLAT MORPH BLD: NORMAL — SIGNIFICANT CHANGE UP
PLATELET # BLD AUTO: 94 K/UL — LOW (ref 150–400)
POTASSIUM SERPL-MCNC: 4.8 MMOL/L — SIGNIFICANT CHANGE UP (ref 3.5–5.3)
POTASSIUM SERPL-SCNC: 4.8 MMOL/L — SIGNIFICANT CHANGE UP (ref 3.5–5.3)
PROT SERPL-MCNC: 8.2 G/DL — SIGNIFICANT CHANGE UP (ref 6–8.3)
RBC # BLD: 4.23 M/UL — SIGNIFICANT CHANGE UP (ref 3.8–5.2)
RBC # FLD: 13.2 % — SIGNIFICANT CHANGE UP (ref 10.3–14.5)
RBC BLD AUTO: SIGNIFICANT CHANGE UP
RH IG SCN BLD-IMP: POSITIVE — SIGNIFICANT CHANGE UP
SODIUM SERPL-SCNC: 138 MMOL/L — SIGNIFICANT CHANGE UP (ref 135–145)
TROPONIN T, HIGH SENSITIVITY RESULT: 14 NG/L — SIGNIFICANT CHANGE UP (ref 0–51)
WBC # BLD: 4.16 K/UL — SIGNIFICANT CHANGE UP (ref 3.8–10.5)
WBC # FLD AUTO: 4.16 K/UL — SIGNIFICANT CHANGE UP (ref 3.8–10.5)

## 2024-03-20 PROCEDURE — 99223 1ST HOSP IP/OBS HIGH 75: CPT | Mod: 57

## 2024-03-20 PROCEDURE — 93289 INTERROG DEVICE EVAL HEART: CPT

## 2024-03-20 PROCEDURE — 99285 EMERGENCY DEPT VISIT HI MDM: CPT

## 2024-03-20 PROCEDURE — 71045 X-RAY EXAM CHEST 1 VIEW: CPT | Mod: 26

## 2024-03-20 PROCEDURE — 99223 1ST HOSP IP/OBS HIGH 75: CPT

## 2024-03-20 RX ORDER — ATORVASTATIN CALCIUM 80 MG/1
40 TABLET, FILM COATED ORAL AT BEDTIME
Refills: 0 | Status: DISCONTINUED | OUTPATIENT
Start: 2024-03-20 | End: 2024-03-26

## 2024-03-20 RX ORDER — MECLIZINE HCL 12.5 MG
25 TABLET ORAL EVERY 8 HOURS
Refills: 0 | Status: DISCONTINUED | OUTPATIENT
Start: 2024-03-20 | End: 2024-03-23

## 2024-03-20 RX ORDER — GABAPENTIN 400 MG/1
300 CAPSULE ORAL DAILY
Refills: 0 | Status: DISCONTINUED | OUTPATIENT
Start: 2024-03-20 | End: 2024-03-26

## 2024-03-20 RX ORDER — ACETAMINOPHEN 500 MG
2 TABLET ORAL
Refills: 0 | DISCHARGE

## 2024-03-20 RX ORDER — ENOXAPARIN SODIUM 100 MG/ML
40 INJECTION SUBCUTANEOUS EVERY 24 HOURS
Refills: 0 | Status: DISCONTINUED | OUTPATIENT
Start: 2024-03-20 | End: 2024-03-20

## 2024-03-20 RX ORDER — AMLODIPINE BESYLATE 2.5 MG/1
10 TABLET ORAL DAILY
Refills: 0 | Status: DISCONTINUED | OUTPATIENT
Start: 2024-03-20 | End: 2024-03-22

## 2024-03-20 RX ORDER — FLUTICASONE PROPIONATE 50 MCG
1 SPRAY, SUSPENSION NASAL
Qty: 0 | Refills: 0 | DISCHARGE

## 2024-03-20 RX ORDER — DORZOLAMIDE HYDROCHLORIDE TIMOLOL MALEATE 20; 5 MG/ML; MG/ML
1 SOLUTION/ DROPS OPHTHALMIC
Refills: 0 | Status: DISCONTINUED | OUTPATIENT
Start: 2024-03-20 | End: 2024-03-26

## 2024-03-20 RX ORDER — FLUTICASONE PROPIONATE 50 MCG
1 SPRAY, SUSPENSION NASAL
Refills: 0 | Status: DISCONTINUED | OUTPATIENT
Start: 2024-03-20 | End: 2024-03-26

## 2024-03-20 RX ORDER — ASPIRIN/CALCIUM CARB/MAGNESIUM 324 MG
162 TABLET ORAL ONCE
Refills: 0 | Status: COMPLETED | OUTPATIENT
Start: 2024-03-20 | End: 2024-03-20

## 2024-03-20 RX ORDER — LATANOPROST 0.05 MG/ML
1 SOLUTION/ DROPS OPHTHALMIC; TOPICAL AT BEDTIME
Refills: 0 | Status: DISCONTINUED | OUTPATIENT
Start: 2024-03-20 | End: 2024-03-26

## 2024-03-20 RX ORDER — LEVOTHYROXINE SODIUM 125 MCG
125 TABLET ORAL DAILY
Refills: 0 | Status: DISCONTINUED | OUTPATIENT
Start: 2024-03-20 | End: 2024-03-26

## 2024-03-20 RX ORDER — CARVEDILOL PHOSPHATE 80 MG/1
25 CAPSULE, EXTENDED RELEASE ORAL EVERY 12 HOURS
Refills: 0 | Status: DISCONTINUED | OUTPATIENT
Start: 2024-03-20 | End: 2024-03-22

## 2024-03-20 RX ORDER — DORZOLAMIDE HYDROCHLORIDE TIMOLOL MALEATE 20; 5 MG/ML; MG/ML
0 SOLUTION/ DROPS OPHTHALMIC
Qty: 0 | Refills: 6 | DISCHARGE

## 2024-03-20 RX ORDER — DORZOLAMIDE HYDROCHLORIDE TIMOLOL MALEATE 20; 5 MG/ML; MG/ML
1 SOLUTION/ DROPS OPHTHALMIC
Refills: 0 | DISCHARGE

## 2024-03-20 RX ORDER — LATANOPROST 0.05 MG/ML
1 SOLUTION/ DROPS OPHTHALMIC; TOPICAL
Refills: 0 | DISCHARGE

## 2024-03-20 RX ORDER — ASPIRIN/CALCIUM CARB/MAGNESIUM 324 MG
81 TABLET ORAL DAILY
Refills: 0 | Status: DISCONTINUED | OUTPATIENT
Start: 2024-03-21 | End: 2024-03-26

## 2024-03-20 RX ORDER — FUROSEMIDE 40 MG
40 TABLET ORAL DAILY
Refills: 0 | Status: DISCONTINUED | OUTPATIENT
Start: 2024-03-20 | End: 2024-03-22

## 2024-03-20 RX ORDER — LATANOPROST 0.05 MG/ML
0 SOLUTION/ DROPS OPHTHALMIC; TOPICAL
Qty: 0 | Refills: 0 | DISCHARGE

## 2024-03-20 RX ORDER — AMLODIPINE BESYLATE 2.5 MG/1
1 TABLET ORAL
Refills: 0 | DISCHARGE

## 2024-03-20 RX ORDER — FUROSEMIDE 40 MG
1 TABLET ORAL
Qty: 0 | Refills: 0 | DISCHARGE

## 2024-03-20 RX ADMIN — LATANOPROST 1 DROP(S): 0.05 SOLUTION/ DROPS OPHTHALMIC; TOPICAL at 23:06

## 2024-03-20 RX ADMIN — DORZOLAMIDE HYDROCHLORIDE TIMOLOL MALEATE 1 DROP(S): 20; 5 SOLUTION/ DROPS OPHTHALMIC at 20:23

## 2024-03-20 RX ADMIN — CARVEDILOL PHOSPHATE 25 MILLIGRAM(S): 80 CAPSULE, EXTENDED RELEASE ORAL at 20:22

## 2024-03-20 RX ADMIN — Medication 162 MILLIGRAM(S): at 20:23

## 2024-03-20 RX ADMIN — ATORVASTATIN CALCIUM 40 MILLIGRAM(S): 80 TABLET, FILM COATED ORAL at 23:06

## 2024-03-20 NOTE — CONSULT NOTE ADULT - SUBJECTIVE AND OBJECTIVE BOX
CHIEF COMPLAINT: BiV ICD at EOL    HISTORY OF PRESENT ILLNESS:  83-year-old F history of hypertension, hyperlipidemia, CHF with an AICD in place presented to the emergency department for evaluation of an AICD battery that is dead.  Patient reports that she had it last checked in July and was advised she had about a year left of battery.  She reports that over the last 6 to 7 months she intermittently gets lightheaded however has not had any syncopal events.  Patient denies any chest pain or shortness of breath. Patient presented to Dr. Alexander's office today where her device was found to be EOL, EKG revealing SR w/ LBBB 70's.     Allergies    No Known Allergies    Intolerances    	    MEDICATIONS:  aspirin  chewable 162 milliGRAM(s) Oral once                  PAST MEDICAL & SURGICAL HISTORY:  Acute on chronic systolic congestive heart failure      Arthritis      Obesity      Pacemaker      S/P ICD (internal cardiac defibrillator) procedure          FAMILY HISTORY:      SOCIAL HISTORY:    Denies smoking, EtOH, illicit drug use       REVIEW OF SYSTEMS:  See HPI. Otherwise, 10 point ROS done and otherwise negative.    PHYSICAL EXAM:  T(C): 36.6 (03-20-24 @ 12:52), Max: 36.6 (03-20-24 @ 12:52)  HR: 72 (03-20-24 @ 14:05) (72 - 73)  BP: 132/86 (03-20-24 @ 14:05) (132/86 - 161/81)  RR: 20 (03-20-24 @ 14:05) (20 - 20)  SpO2: 97% (03-20-24 @ 14:05) (95% - 97%)  Wt(kg): --  I&O's Summary      Appearance: Alert. NAD	  HEENT:   NC/AT	  Cardiovascular: +S1S2 RRR no m/g/r  Respiratory: CTA B/L	  Psychiatry: A & O x 3, Mood & affect appropriate  Gastrointestinal:  Soft, NT.ND., + BS	  Skin: No rashes	masses or lesions  ICD site cdi no erythema   Neurologic: Non-focal  Extremities: No edema BLE  Vascular: Peripheral pulses palpable 2+ bilaterally      LABS:	 	    CARDIAC MARKERS:    TELEMETRY: 	    ECG:  	  RADIOLOGY:  OTHER: 	    PREVIOUS DIAGNOSTIC TESTING:    Echocardiogram:     Catheterization: < from: Cardiac Catheterization (09.02.22 @ 12:48) >  Diagnostic Findings:     Coronary Angiography   The coronary circulation is left dominant.      LM   Left main artery: Angiography shows no disease.      LAD  Left anterior descending artery: Angiography shows no disease.      CX   Circumflex: Angiography shows no disease.      RCA   Right coronary artery: Angiography shows no disease.      < end of copied text >      Stress Test:  	8/2022 normal nuclear stress, LVEF 68%  	     CHIEF COMPLAINT: BiV ICD at EOL    HISTORY OF PRESENT ILLNESS:  83-year-old F history of hypertension, hyperlipidemia, CHF with a BiV ICD ([employer]) in place presented to the emergency department for evaluation of battery at EOL  Patient reports that she had it last checked in July and was advised she had about a year left of battery.  She reports that over the last 6 to 7 months she intermittently gets lightheaded however has not had any syncopal events.  Patient denies any chest pain or shortness of breath. Patient presented to Dr. Alexander's office today where her device was found to be EOL, EKG revealing SR w/ LBBB 70's.     Allergies    No Known Allergies    Intolerances    	    MEDICATIONS:  aspirin  chewable 162 milliGRAM(s) Oral once                  PAST MEDICAL & SURGICAL HISTORY:  Acute on chronic systolic congestive heart failure      Arthritis      Obesity      Pacemaker      S/P ICD (internal cardiac defibrillator) procedure          FAMILY HISTORY:      SOCIAL HISTORY:    Denies smoking, EtOH, illicit drug use       REVIEW OF SYSTEMS:  See HPI. Otherwise, 10 point ROS done and otherwise negative.    PHYSICAL EXAM:  T(C): 36.6 (03-20-24 @ 12:52), Max: 36.6 (03-20-24 @ 12:52)  HR: 72 (03-20-24 @ 14:05) (72 - 73)  BP: 132/86 (03-20-24 @ 14:05) (132/86 - 161/81)  RR: 20 (03-20-24 @ 14:05) (20 - 20)  SpO2: 97% (03-20-24 @ 14:05) (95% - 97%)  Wt(kg): --  I&O's Summary      Appearance: Alert. NAD	  HEENT:   NC/AT	  Cardiovascular: +S1S2 RRR no m/g/r  Respiratory: CTA B/L	  Psychiatry: A & O x 3, Mood & affect appropriate  Gastrointestinal:  Soft, NT.ND., + BS	  Skin: No rashes	masses or lesions  ICD site cdi no erythema   Neurologic: Non-focal  Extremities: No edema BLE  Vascular: Peripheral pulses palpable 2+ bilaterally      LABS:	 	    CARDIAC MARKERS:    TELEMETRY: 	 SR 70's   ECG:      PREVIOUS DIAGNOSTIC TESTING:    Echocardiogram:     Catheterization: < from: Cardiac Catheterization (09.02.22 @ 12:48) >  Diagnostic Findings:     Coronary Angiography   The coronary circulation is left dominant.      LM   Left main artery: Angiography shows no disease.      LAD  Left anterior descending artery: Angiography shows no disease.      CX   Circumflex: Angiography shows no disease.      RCA   Right coronary artery: Angiography shows no disease.      < end of copied text >      Stress Test:  	8/2022 normal nuclear stress, LVEF 68%

## 2024-03-20 NOTE — H&P ADULT - HISTORY OF PRESENT ILLNESS
83y Female with PMH of --- presents from --- with ---.   Does not endorse any fever, chills, headache, neurological deficits, nausea, vomiting, chest pain, palpitations, shortness of breathe, cough, abdominal pain, hematochezia, melena, hematemesis, diarrhea or constipation currently 83y Female with PMH of HFimpEF (61% in 03/2022) s/p [employer] AICD, HoTD, HTN, vertigo presents from EP clinic for nonfunctioning AICD. Patient reports she has been having episodes of lightheadedness for last  6 months and today was the only time the device was interrogated. She mentions they found couple of events recorded and that the AICD did not function appropriately. Therefore was sent her by her cardiologist. Does not endorse any fever, chills, headache, neurological deficits, nausea, vomiting, chest pain, palpitations, shortness of breathe, cough, abdominal pain, hematochezia, melena, hematemesis, diarrhea or constipation currently

## 2024-03-20 NOTE — ED ADULT NURSE NOTE - NSFALLHARMRISKINTERV_ED_ALL_ED

## 2024-03-20 NOTE — ED PROVIDER NOTE - OTHER FINDINGS
ECG recorded at 1503 independently interpreted by me , Dr Rashawn Leo,  at 1519 shows normal sinus rhythm borderline left axis deviation  IVCD 3 mm ST elevation lead V2, 1.5 mm ST elevation lead V3, compared to ECG dated 9/2022, no ventricular pacing spikes are seen

## 2024-03-20 NOTE — ED PROVIDER NOTE - OBJECTIVE STATEMENT
83-year-old female history of hypertension, hyperlipidemia, CHF with an AICD in place presented to the emergency department for evaluation of an AICD battery that is dead.  Patient reports that she had it last checked in July and was advised she had about a year left of battery.  She reports that over the last 6 to 7 months she intermittently gets lightheaded however has not had any syncopal events.  Patient denies any chest pain or shortness of breath.

## 2024-03-20 NOTE — H&P ADULT - NSHPPHYSICALEXAM_GEN_ALL_CORE
PHYSICAL EXAM  Vital Signs Last 24 Hrs  T(C): 36.6 (20 Mar 2024 12:52), Max: 36.6 (20 Mar 2024 12:52)  T(F): 97.9 (20 Mar 2024 12:52), Max: 97.9 (20 Mar 2024 12:52)  HR: 72 (20 Mar 2024 14:05) (72 - 73)  BP: 132/86 (20 Mar 2024 14:05) (132/86 - 161/81)  BP(mean): --  RR: 20 (20 Mar 2024 14:05) (20 - 20)  SpO2: 97% (20 Mar 2024 14:05) (95% - 97%)    Parameters below as of 20 Mar 2024 14:05  Patient On (Oxygen Delivery Method): room air        CONSTITUTIONAL: Well-groomed, in no apparent distress;  EYES: No conjunctival or scleral injection, non-icteric;  ENMT: No external nasal lesions; Normal outer ears;  NECK: Trachea midline;  RESPIRATORY: Normal respiratory effort; Decreased breathe sounds bilaterally without wheeze/rhonchi/rales;  CARDIOVASCULAR: Regular rate and rhythm;  GASTROINTESTINAL: Non-distended; No palpable masses; No rebound/guarding;  EXTREMITIES:  No lower extremity edema;  NEUROLOGY: A+O to person, place, and time; Does respond to commands appropriately;  PSYCHIATRY: Mood and Affect appropriate PHYSICAL EXAM  Vital Signs Last 24 Hrs  T(C): 36.6 (20 Mar 2024 12:52), Max: 36.6 (20 Mar 2024 12:52)  T(F): 97.9 (20 Mar 2024 12:52), Max: 97.9 (20 Mar 2024 12:52)  HR: 72 (20 Mar 2024 14:05) (72 - 73)  BP: 132/86 (20 Mar 2024 14:05) (132/86 - 161/81)  BP(mean): --  RR: 20 (20 Mar 2024 14:05) (20 - 20)  SpO2: 97% (20 Mar 2024 14:05) (95% - 97%)    Parameters below as of 20 Mar 2024 14:05  Patient On (Oxygen Delivery Method): room air        CONSTITUTIONAL: Well-groomed, in no apparent distress;  EYES: No conjunctival or scleral injection, non-icteric;  ENMT: No external nasal lesions; Normal outer ears;  NECK: Trachea midline;  RESPIRATORY: Normal respiratory effort; Decreased breathe sounds bilaterally without wheeze/rhonchi/rales;  CARDIOVASCULAR: Regular rate and rhythm;  GASTROINTESTINAL: Non-distended; No palpable masses; No rebound/guarding;  EXTREMITIES: 2+ lower extremity edema;  NEUROLOGY: A+O to person, place, and time; Does respond to commands appropriately;  PSYCHIATRY: Mood and Affect appropriate

## 2024-03-20 NOTE — PROCEDURE
[CRT-D] : Cardiac resynchronization therapy defibrillator [VVI] : VVI [Longevity: ___ months] : The estimated remaining battery life is [unfilled] months [Lead Imp:  ___ohms] : lead impedance was [unfilled] ohms [Sensing Amplitude ___mv] : sensing amplitude was [unfilled] mv [___V @] : [unfilled] V [___ ms] : [unfilled] ms [de-identified] : [facility] [de-identified] : 3/7/2013 [de-identified] : N160 [de-identified] : 50 [de-identified] : Patient went to [facility] in November/now eol on February 14 she was advised to go immediately to the [facility] and has an emergency room to have the device replaced.  Dr. Ta of the electrophysiology service was notified.

## 2024-03-20 NOTE — ED CLERICAL - NS ED CLERK NOTE PRE-ARRIVAL INFORMATION; ADDITIONAL PRE-ARRIVAL INFORMATION
Call from Olivia CHIN from Dr. John Alexander's office, referring patient from office who presented for pacemaker evaluation ([employer] Bi-V ICD) that is at end of life (not GERARD), needs battery change  c/b # [phone]  consult EP

## 2024-03-20 NOTE — ED ADULT NURSE NOTE - OBJECTIVE STATEMENT
Female 83 years old Female 83 years old with past medical history of HTN, CHF with an AICD, sent here by cardiology for replacement of AICD battery. Pt states the battery is dead. Reports lightheadedness sometimes over the last 7 months. Denies chest pain, sob, N/V. cough, fever or chills. Safety and comfort maintained. Call bell within easy reach. Will continue to monitor.

## 2024-03-20 NOTE — ED ADULT TRIAGE NOTE - CHIEF COMPLAINT QUOTE
went to cardiologist today to check pacemaker, was told she needs pacemaker battery changed  had pacemaker placed about 11 years ago

## 2024-03-20 NOTE — H&P ADULT - PROBLEM SELECTOR PLAN 3
EF = 61% on 03/2022  Heart failure, CHF order set initiated  Guideline directed medical therapy as below:  - Diuretic:  - BB:    - ARNI/ACE-I/ARB:  - Hydralazine/Nitrate:  - MRA:  - SGLT2:  Maintain K > 4, Mg > 2 EF = 61% on 03/2022  Heart failure, CHF order set initiated  Guideline directed medical therapy as below:  - Diuretic: resume lasix 40 mg QD PO  - BB:  resume coreg 25 mg BID  - ARNI/ACE-I/ARB: enalapril 20 mg QD  - Hydralazine/Nitrate: Not indicated  - MRA: Not indicated  - SGLT2: Consider starting at d/c  Maintain K > 4, Mg > 2  Not in acute exacerbation

## 2024-03-20 NOTE — ED PROVIDER NOTE - PROGRESS NOTE DETAILS
EP at bedside with plan for battery change tomorrow, requesting admission to medicine reassesed patient xray being read as ?? pna, patient with no cough, fever or infectious symptoms. no leukocytosis, unlikely to be infectious at this time.pmd requests admission to Dr. Ba Caba PA-C

## 2024-03-20 NOTE — ED PROVIDER NOTE - CLINICAL SUMMARY MEDICAL DECISION MAKING FREE TEXT BOX
Ramonita: 83 year old female with pmhx of htn, HLD, CHF with AICD, here for admission for battery change for ppm. pE: att exam: patient awake alert NAD . LUNGS CTAB no wheeze no crackle. CARD RRR no m/r/g.  Abdomen soft NT ND no rebound no guarding no CVA tenderness. EXT WWP no edema no calf tenderness CV 2+DP/PT bilaterally. neuro A&Ox3 gait normal.  skin warm and dry no rash  plan: will get labs, cxr, admit for battery change. placed on monitor, epconsult.

## 2024-03-20 NOTE — ED ADULT TRIAGE NOTE - AS TEMP SITE
----- Message from Neymar Huff sent at 4/25/2022  2:06 PM CDT -----  Contact: @ [phone]  Patient calling to get an update on the f/up appt ( in person ) pls call ( his dtr would like to attend the appt she's in town 5-2st -5-6th ) pls advise      oral

## 2024-03-20 NOTE — H&P ADULT - ASSESSMENT
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

## 2024-03-20 NOTE — H&P ADULT - PROBLEM SELECTOR PLAN 1
Interrogate PPM for any events  f/u EP recs Interrogate AICD for events. Evaluate for all functions/battery  f/u EP recs Interrogate AICD for events. Evaluate for all functions/battery  Order TTE  PT eval  Obtain TSH/free T4  f/u EP recs

## 2024-03-21 ENCOUNTER — RESULT REVIEW (OUTPATIENT)
Age: 84
End: 2024-03-21

## 2024-03-21 ENCOUNTER — TRANSCRIPTION ENCOUNTER (OUTPATIENT)
Age: 84
End: 2024-03-21

## 2024-03-21 LAB
ALBUMIN SERPL ELPH-MCNC: 4 G/DL — SIGNIFICANT CHANGE UP (ref 3.3–5)
ALP SERPL-CCNC: 71 U/L — SIGNIFICANT CHANGE UP (ref 40–120)
ALT FLD-CCNC: 11 U/L — SIGNIFICANT CHANGE UP (ref 10–45)
ANION GAP SERPL CALC-SCNC: 13 MMOL/L — SIGNIFICANT CHANGE UP (ref 5–17)
AST SERPL-CCNC: 12 U/L — SIGNIFICANT CHANGE UP (ref 10–40)
BASOPHILS # BLD AUTO: 0.01 K/UL — SIGNIFICANT CHANGE UP (ref 0–0.2)
BASOPHILS NFR BLD AUTO: 0.3 % — SIGNIFICANT CHANGE UP (ref 0–2)
BILIRUB SERPL-MCNC: 0.3 MG/DL — SIGNIFICANT CHANGE UP (ref 0.2–1.2)
BUN SERPL-MCNC: 20 MG/DL — SIGNIFICANT CHANGE UP (ref 7–23)
CALCIUM SERPL-MCNC: 9.7 MG/DL — SIGNIFICANT CHANGE UP (ref 8.4–10.5)
CHLORIDE SERPL-SCNC: 101 MMOL/L — SIGNIFICANT CHANGE UP (ref 96–108)
CO2 SERPL-SCNC: 26 MMOL/L — SIGNIFICANT CHANGE UP (ref 22–31)
CREAT SERPL-MCNC: 0.79 MG/DL — SIGNIFICANT CHANGE UP (ref 0.5–1.3)
EGFR: 74 ML/MIN/1.73M2 — SIGNIFICANT CHANGE UP
EOSINOPHIL # BLD AUTO: 0.02 K/UL — SIGNIFICANT CHANGE UP (ref 0–0.5)
EOSINOPHIL NFR BLD AUTO: 0.5 % — SIGNIFICANT CHANGE UP (ref 0–6)
GLUCOSE SERPL-MCNC: 90 MG/DL — SIGNIFICANT CHANGE UP (ref 70–99)
HCT VFR BLD CALC: 35.9 % — SIGNIFICANT CHANGE UP (ref 34.5–45)
HGB BLD-MCNC: 12 G/DL — SIGNIFICANT CHANGE UP (ref 11.5–15.5)
IMM GRANULOCYTES NFR BLD AUTO: 0.3 % — SIGNIFICANT CHANGE UP (ref 0–0.9)
LYMPHOCYTES # BLD AUTO: 1.63 K/UL — SIGNIFICANT CHANGE UP (ref 1–3.3)
LYMPHOCYTES # BLD AUTO: 41.2 % — SIGNIFICANT CHANGE UP (ref 13–44)
MAGNESIUM SERPL-MCNC: 1.9 MG/DL — SIGNIFICANT CHANGE UP (ref 1.6–2.6)
MCHC RBC-ENTMCNC: 30.8 PG — SIGNIFICANT CHANGE UP (ref 27–34)
MCHC RBC-ENTMCNC: 33.4 GM/DL — SIGNIFICANT CHANGE UP (ref 32–36)
MCV RBC AUTO: 92.3 FL — SIGNIFICANT CHANGE UP (ref 80–100)
MONOCYTES # BLD AUTO: 0.54 K/UL — SIGNIFICANT CHANGE UP (ref 0–0.9)
MONOCYTES NFR BLD AUTO: 13.6 % — SIGNIFICANT CHANGE UP (ref 2–14)
NEUTROPHILS # BLD AUTO: 1.75 K/UL — LOW (ref 1.8–7.4)
NEUTROPHILS NFR BLD AUTO: 44.1 % — SIGNIFICANT CHANGE UP (ref 43–77)
NRBC # BLD: 0 /100 WBCS — SIGNIFICANT CHANGE UP (ref 0–0)
PHOSPHATE SERPL-MCNC: 3 MG/DL — SIGNIFICANT CHANGE UP (ref 2.5–4.5)
PLATELET # BLD AUTO: 90 K/UL — LOW (ref 150–400)
POTASSIUM SERPL-MCNC: 3.7 MMOL/L — SIGNIFICANT CHANGE UP (ref 3.5–5.3)
POTASSIUM SERPL-SCNC: 3.7 MMOL/L — SIGNIFICANT CHANGE UP (ref 3.5–5.3)
PROT SERPL-MCNC: 7.7 G/DL — SIGNIFICANT CHANGE UP (ref 6–8.3)
RBC # BLD: 3.89 M/UL — SIGNIFICANT CHANGE UP (ref 3.8–5.2)
RBC # FLD: 13.2 % — SIGNIFICANT CHANGE UP (ref 10.3–14.5)
SODIUM SERPL-SCNC: 140 MMOL/L — SIGNIFICANT CHANGE UP (ref 135–145)
T4 FREE SERPL-MCNC: 1.1 NG/DL — SIGNIFICANT CHANGE UP (ref 0.9–1.8)
TSH SERPL-MCNC: 5.94 UIU/ML — HIGH (ref 0.27–4.2)
WBC # BLD: 3.96 K/UL — SIGNIFICANT CHANGE UP (ref 3.8–10.5)
WBC # FLD AUTO: 3.96 K/UL — SIGNIFICANT CHANGE UP (ref 3.8–10.5)

## 2024-03-21 PROCEDURE — 33264 RMVL & RPLCMT DFB GEN MLT LD: CPT

## 2024-03-21 PROCEDURE — 93010 ELECTROCARDIOGRAM REPORT: CPT | Mod: 77

## 2024-03-21 PROCEDURE — 93306 TTE W/DOPPLER COMPLETE: CPT | Mod: 26

## 2024-03-21 PROCEDURE — 93010 ELECTROCARDIOGRAM REPORT: CPT

## 2024-03-21 PROCEDURE — 93356 MYOCRD STRAIN IMG SPCKL TRCK: CPT

## 2024-03-21 RX ORDER — CHLORHEXIDINE GLUCONATE 213 G/1000ML
1 SOLUTION TOPICAL DAILY
Refills: 0 | Status: DISCONTINUED | OUTPATIENT
Start: 2024-03-21 | End: 2024-03-26

## 2024-03-21 RX ORDER — CEPHALEXIN 500 MG
500 CAPSULE ORAL EVERY 12 HOURS
Refills: 0 | Status: COMPLETED | OUTPATIENT
Start: 2024-03-21 | End: 2024-03-24

## 2024-03-21 RX ADMIN — DORZOLAMIDE HYDROCHLORIDE TIMOLOL MALEATE 1 DROP(S): 20; 5 SOLUTION/ DROPS OPHTHALMIC at 05:25

## 2024-03-21 RX ADMIN — Medication 81 MILLIGRAM(S): at 18:08

## 2024-03-21 RX ADMIN — CARVEDILOL PHOSPHATE 25 MILLIGRAM(S): 80 CAPSULE, EXTENDED RELEASE ORAL at 05:24

## 2024-03-21 RX ADMIN — Medication 20 MILLIGRAM(S): at 05:24

## 2024-03-21 RX ADMIN — CHLORHEXIDINE GLUCONATE 1 APPLICATION(S): 213 SOLUTION TOPICAL at 18:08

## 2024-03-21 RX ADMIN — DORZOLAMIDE HYDROCHLORIDE TIMOLOL MALEATE 1 DROP(S): 20; 5 SOLUTION/ DROPS OPHTHALMIC at 18:09

## 2024-03-21 RX ADMIN — GABAPENTIN 300 MILLIGRAM(S): 400 CAPSULE ORAL at 13:21

## 2024-03-21 RX ADMIN — LATANOPROST 1 DROP(S): 0.05 SOLUTION/ DROPS OPHTHALMIC; TOPICAL at 21:44

## 2024-03-21 RX ADMIN — Medication 1 SPRAY(S): at 05:25

## 2024-03-21 RX ADMIN — ATORVASTATIN CALCIUM 40 MILLIGRAM(S): 80 TABLET, FILM COATED ORAL at 21:44

## 2024-03-21 RX ADMIN — CARVEDILOL PHOSPHATE 25 MILLIGRAM(S): 80 CAPSULE, EXTENDED RELEASE ORAL at 18:07

## 2024-03-21 RX ADMIN — Medication 1 SPRAY(S): at 18:08

## 2024-03-21 RX ADMIN — Medication 125 MICROGRAM(S): at 05:25

## 2024-03-21 RX ADMIN — AMLODIPINE BESYLATE 10 MILLIGRAM(S): 2.5 TABLET ORAL at 05:25

## 2024-03-21 RX ADMIN — Medication 40 MILLIGRAM(S): at 05:24

## 2024-03-21 RX ADMIN — Medication 500 MILLIGRAM(S): at 16:50

## 2024-03-21 NOTE — DISCHARGE NOTE PROVIDER - NSDCFUSCHEDAPPT_GEN_ALL_CORE_FT
[facility]  ELECTROP[address]  Scheduled Appointment: 04/03/2024

## 2024-03-21 NOTE — PRE-ANESTHESIA EVALUATION ADULT - NSANTHOSAYNRD_GEN_A_CORE
No. SULEIMAN screening performed.  STOP BANG Legend: 0-2 = LOW Risk; 3-4 = INTERMEDIATE Risk; 5-8 = HIGH Risk

## 2024-03-21 NOTE — PROCEDURE NOTE - ADDITIONAL PROCEDURE DETAILS
Indication: palpitations  Patient is Atrial sensed, BiV Paced 60-70's  - patient had episode of PMT noted on interrogation event log correlating with palpitations today.  - Changes Made: PVARP extended to 340-400ms      [id number]

## 2024-03-21 NOTE — PHYSICAL THERAPY INITIAL EVALUATION ADULT - ADDITIONAL COMMENTS
Pt resides in a private home with her daughter. +4 steps to enter. PTA, pt ambulated with RW and was independent with ADLs

## 2024-03-21 NOTE — PRE-ANESTHESIA EVALUATION ADULT - NSANTHPEFT_GEN_ALL_CORE
Awake and alert, no focal deficits  Normal rate/rhythm, no murmur, palpable pulses, LE edema  Nonlabored respirations, no wheezing  Obese, abd soft, nondistended

## 2024-03-21 NOTE — DIETITIAN INITIAL EVALUATION ADULT - PERTINENT MEDS FT
MEDICATIONS  (STANDING):  amLODIPine   Tablet 10 milliGRAM(s) Oral daily  aspirin  chewable 81 milliGRAM(s) Oral daily  atorvastatin 40 milliGRAM(s) Oral at bedtime  carvedilol 25 milliGRAM(s) Oral every 12 hours  cephalexin 500 milliGRAM(s) Oral every 12 hours  dorzolamide 2%/timolol 0.5% Ophthalmic Solution 1 Drop(s) Both EYES two times a day  enalapril 20 milliGRAM(s) Oral daily  fluticasone propionate 50 MICROgram(s)/spray Nasal Spray 1 Spray(s) Both Nostrils two times a day  furosemide    Tablet 40 milliGRAM(s) Oral daily  gabapentin 300 milliGRAM(s) Oral daily  latanoprost 0.005% Ophthalmic Solution 1 Drop(s) Both EYES at bedtime  levothyroxine 125 MICROGram(s) Oral daily    MEDICATIONS  (PRN):  meclizine 25 milliGRAM(s) Oral every 8 hours PRN Dizziness

## 2024-03-21 NOTE — DIETITIAN INITIAL EVALUATION ADULT - EDUCATION DIETARY MODIFICATIONS
RD provided education on need to follow low Na diet and track wt for changes (fluid retention), reviewed wt parameters. Pt made aware RD to remain available./(2) meets goals/outcomes/verbalization

## 2024-03-21 NOTE — DIETITIAN INITIAL EVALUATION ADULT - OTHER INFO
Wt Hx:   Dosing wt 114.3 kG/251.9 lbs. Daily wt 249.3 lbs (3/21).   UBW ~250 lbs and denies any recent changes in wt PTA  Ht per pt: 67 inches    IBW: 135 lbs    IBW%: 187%    Nutrition-Related Concerns:   - PO synthroid  - Being diuresed on Lasix

## 2024-03-21 NOTE — DISCHARGE NOTE PROVIDER - NSDCFUADDAPPT_GEN_ALL_CORE_FT
Pt is to follow up at [facility] ENT clinic upon discharge Call [phone] to make appointment or pt can try Carolann Felipe or Haim @ [phone].    Follow up in EP Clinic on 4/3/24 at 9:40am for wound and device check

## 2024-03-21 NOTE — PHYSICAL THERAPY INITIAL EVALUATION ADULT - PERTINENT HX OF CURRENT PROBLEM, REHAB EVAL
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 Patient reports she has been having episodes of lightheadedness for last  6 months and today was the only time the device was interrogated. She mentions they found couple of events recorded and that the AICD did not function appropriately. Therefore was sent her by her cardiologist. Does not endorse any fever, chills, headache, neurological deficits, nausea, vomiting, chest pain, palpitations, shortness of breathe, cough, abdominal pain, hematochezia, melena, hematemesis, diarrhea or constipation currently. Hosp course: XR chest (3/20)  Focal patchy opacities in the right lower lung may represent atelectasis or pneumonia.

## 2024-03-21 NOTE — DISCHARGE NOTE PROVIDER - NSDCMRMEDTOKEN_GEN_ALL_CORE_FT
amLODIPine 10 mg oral tablet: 1 tab(s) orally once a day  aspirin 81 mg oral tablet, chewable: 1 tab(s) orally once a day  Coreg 25 mg oral tablet: 1 tab(s) orally 2 times a day  dorzolamide-timolol 2.23%-0.68% (2%-0.5% base) ophthalmic solution: 1 drop(s) in each eye 2 times a day  enalapril 20 mg oral tablet: 1 tab(s) orally once a day  gabapentin 300 mg oral capsule: 1 cap(s) orally once a day  Lasix 40 mg oral tablet: 1 tab(s) orally once a day  latanoprost 0.005% ophthalmic solution: 1 drop(s) in each eye once a day (at bedtime)  levothyroxine 125 mcg (0.125 mg) oral tablet: 1 tab(s) orally once a day  ocular lubricant ophthalmic solution: 1 drop(s) in each eye 2 times a day as needed for  dry eyes  pravastatin 40 mg oral tablet: 1 tab(s) orally once a day  Tylenol 325 mg oral tablet: 2 tab(s) orally as needed   amLODIPine 10 mg oral tablet: 1 tab(s) orally once a day  aspirin 81 mg oral tablet, chewable: 1 tab(s) orally once a day  Coreg 12.5 mg oral tablet: 1 tab(s) orally 2 times a day  dorzolamide-timolol 2.23%-0.68% (2%-0.5% base) ophthalmic solution: 1 drop(s) in each eye 2 times a day  enalapril 10 mg oral tablet: 1 tab(s) orally once a day  fluticasone 50 mcg/inh nasal spray: 1 spray(s) nasal 2 times a day  gabapentin 300 mg oral capsule: 1 cap(s) orally once a day  Lasix 40 mg oral tablet: 1 tab(s) orally once a day  latanoprost 0.005% ophthalmic solution: 1 drop(s) in each eye once a day (at bedtime)  levothyroxine 125 mcg (0.125 mg) oral tablet: 1 tab(s) orally once a day  meclizine 12.5 mg oral tablet: 1 tab(s) orally every 8 hours  ocular lubricant ophthalmic solution: 1 drop(s) in each eye 2 times a day as needed for  dry eyes  pravastatin 40 mg oral tablet: 1 tab(s) orally once a day  Tylenol 325 mg oral tablet: 2 tab(s) orally as needed

## 2024-03-21 NOTE — PATIENT PROFILE ADULT - FUNCTIONAL ASSESSMENT - DAILY ACTIVITY 1.
Implemented All Universal Safety Interventions:  [place] to call system. Call bell, personal items and telephone within reach. Instruct patient to call for assistance. Room bathroom lighting operational. Non-slip footwear when patient is off stretcher. Physically safe environment: no spills, clutter or unnecessary equipment. Stretcher in lowest position, wheels locked, appropriate side rails in place. 3 = A little assistance

## 2024-03-21 NOTE — DISCHARGE NOTE PROVIDER - NSDCCPCAREPLAN_GEN_ALL_CORE_FT
PRINCIPAL DISCHARGE DIAGNOSIS  Diagnosis: Pacer at end of battery life  Assessment and Plan of Treatment: Incision care  - Do not shower or get the incision wet until after the wound  check visit 7 to 14 days after your procedure.  - You can take off the clear bandage over the incision the day  after your procedure.  - When your physician tells you it is OK, you can shower and get  the incision wet. Let the water, soap and shampoo trickle over  the incision. Do not scrub the incision or the area around it.  Pat the incision dry.  - Do not take a bath or submerge the incision in water for  6 weeks.  - Do not put any lotions, creams or gel over the incision.  - You can take off the tape strips (Steri-Strips™) 1 week after  your wound check visit.  - If you have Dermabond® glue over your incision, it will come  off by itself each time you shower.  - Protect your incision from the sun to avoid sunburn and  decrease scarring.  Activity guidelines  - You do not have any arm movement restrictions.  - You may continue with your regular routine exercise as approved by your physician.  When to call your physician  Call your physician if you have any of these signs of infection:  - A temperature more than 100 degrees F  - Redness, swelling or tenderness at the incision site  - Drainage from the incision site  Call 911 or go to the nearest emergency department if you have any of these symptoms:  - Chest pain  - Palpitations  - Fainting or passing out       PRINCIPAL DISCHARGE DIAGNOSIS  Diagnosis: Pacer at end of battery life  Assessment and Plan of Treatment: Incision care  - Do not shower or get the incision wet until after the wound  check visit 7 to 14 days after your procedure.  - You can take off the clear bandage over the incision the day  after your procedure.  - When your physician tells you it is OK, you can shower and get  the incision wet. Let the water, soap and shampoo trickle over  the incision. Do not scrub the incision or the area around it.  Pat the incision dry.  - Do not take a bath or submerge the incision in water for  6 weeks.  - Do not put any lotions, creams or gel over the incision.  - You can take off the tape strips (Steri-Strips™) 1 week after  your wound check visit.  - If you have Dermabond® glue over your incision, it will come  off by itself each time you shower.  - Protect your incision from the sun to avoid sunburn and  decrease scarring.  Activity guidelines  - You do not have any arm movement restrictions.  - You may continue with your regular routine exercise as approved by your physician.  When to call your physician  Call your physician if you have any of these signs of infection:  - A temperature more than 100 degrees F  - Redness, swelling or tenderness at the incision site  - Drainage from the incision site  Call 911 or go to the nearest emergency department if you have any of these symptoms:  - Chest pain  - Palpitations  - Fainting or passing out        SECONDARY DISCHARGE DIAGNOSES  Diagnosis: Lightheadedness  Assessment and Plan of Treatment: Lightheadedness with unclear etiology  Head cat scan with no acute findings  Seen by ENT, follow up out-patient  Vestibular therapy out-patient    Diagnosis: HTN (hypertension)  Assessment and Plan of Treatment: Low salt diet  Activity as tolerated.  Take all medication as prescribed.  Follow up with your medical doctor for routine blood pressure monitoring at your next visit.  Notify your doctor if you have any of the following symptoms:   Dizziness, Lightheadedness, Blurry vision, Headache, Chest pain, Shortness of breath

## 2024-03-21 NOTE — PATIENT PROFILE ADULT - FALL HARM RISK - RISK INTERVENTIONS
Assistance OOB with selected safe patient handling equipment/Assistance with ambulation/Communicate Fall Risk and Risk Factors to all staff, patient, and family/Discuss with provider need for PT consult/Monitor gait and stability/Provide patient with walking aids - walker, cane, crutches/Reinforce activity limits and safety measures with patient and family/Sit up slowly, dangle for a short time, stand at bedside before walking/Visual Cue: Yellow wristband/Bed in lowest position, wheels locked, appropriate side rails in place/Call bell, personal items and telephone in reach/Instruct patient to call for assistance before getting out of bed or chair/Non-slip footwear when patient is out of bed/[place] to call system/Physically safe environment - no spills, clutter or unnecessary equipment/Purposeful Proactive Rounding/Room/bathroom lighting operational, light cord in reach

## 2024-03-21 NOTE — DISCHARGE NOTE PROVIDER - HOSPITAL COURSE
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 Patient reports she has been having episodes of lightheadedness for last  6 months and today was the only time the device was interrogated. She mentions they found couple of events recorded and that the AICD did not function appropriately. Therefore was sent her by her cardiologist. Does not endorse any fever, chills, headache, neurological deficits, nausea, vomiting, chest pain, palpitations, shortness of breathe, cough, abdominal pain, hematochezia, melena, hematemesis, diarrhea or constipation currently (20 Mar 2024 16:56)    Hospital Course:  Patient was admitted for lightheadedness. EP consulted, OP cardiologist was evaluating battery, EKG revealed SR w/ LBBB. s/p generator change 3/21/24. Heparin SQ/Lovenox held to prevent pocket hematoma pre and post generator change. No complications noted after procedure, patient was monitored on telemetry during admission.    Patient is medically cleared for discharge. Medication reconciliation reviewed, revised, and resolved with Dr. Dozier who had medically cleared patient for discharge with follow-up as advised. Patient is currently stable for discharge to home at this time.    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 Patient reports she has been having episodes of lightheadedness for last  6 months and today was the only time the device was interrogated. She mentions they found couple of events recorded and that the AICD did not function appropriately. Therefore was sent her by her cardiologist. Does not endorse any fever, chills, headache, neurological deficits, nausea, vomiting, chest pain, palpitations, shortness of breathe, cough, abdominal pain, hematochezia, melena, hematemesis, diarrhea or constipation currently (20 Mar 2024 16:56)    Hospital Course:  Patient was admitted for lightheadedness. EP consulted, OP cardiologist was evaluating battery, EKG revealed SR w/ LBBB. s/p generator change 3/21/24. Heparin SQ/Lovenox held to prevent pocket hematoma pre and post generator change. No complications noted after procedure, patient was monitored on telemetry during admission. Discharge prolonged due to persistent lightheadedness on standing. Orthostatic negative, CTH with no acute findings, ENT consulted rec vestibular therapy and out-pt follow up.    Patient is medically cleared for discharge. Medication reconciliation reviewed, revised, and resolved with Dr. Dozier who had medically cleared patient for discharge with follow-up as advised. Patient is currently stable for discharge to home at this time.    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 Patient reports she has been having episodes of lightheadedness for last  6 months and today was the only time the device was interrogated. She mentions they found couple of events recorded and that the AICD did not function appropriately. Therefore was sent her by her cardiologist. Does not endorse any fever, chills, headache, neurological deficits, nausea, vomiting, chest pain, palpitations, shortness of breathe, cough, abdominal pain, hematochezia, melena, hematemesis, diarrhea or constipation currently (20 Mar 2024 16:56)    Hospital Course:  Patient was admitted for lightheadedness. EP consulted, OP cardiologist was evaluating battery, EKG revealed SR w/ LBBB. s/p generator change 3/21/24. Heparin SQ/Lovenox held to prevent pocket hematoma pre and post generator change. No complications noted after procedure, patient was monitored on telemetry during admission. Discharge prolonged due to persistent lightheadedness on standing. Orthostatic negative, CTH with no acute findings, ENT consulted rec vestibular therapy and out-pt follow up.    Patient is medically cleared for discharge. Medication reconciliation reviewed, revised, and resolved with Dr. Dozier who had medically cleared patient for discharge with follow-up as advised. Patient is currently stable for discharge to home at this time.    Important Medication Changes and Reason:    Active or Pending Issues Requiring Follow-up:  - PCP  - EP  -ENT for vestibular therapy    Advanced Directives:   [x] Full code  [ ] DNR  [ ] Hospice      Discharge Diagnoses:  1) HF, recovered EF 2022 61%   2) LBBB   3) BiV ICD at EOL   4) Lightheadedness

## 2024-03-21 NOTE — DIETITIAN INITIAL EVALUATION ADULT - PROBLEM SELECTOR PLAN 3
EF = 61% on 03/2022  Heart failure, CHF order set initiated  Guideline directed medical therapy as below:  - Diuretic: resume lasix 40 mg QD PO  - BB:  resume coreg 25 mg BID  - ARNI/ACE-I/ARB: enalapril 20 mg QD  - Hydralazine/Nitrate: Not indicated  - MRA: Not indicated  - SGLT2: Consider starting at d/c  Maintain K > 4, Mg > 2  Not in acute exacerbation

## 2024-03-21 NOTE — PRE-ANESTHESIA EVALUATION ADULT - NSANTHPMHFT_GEN_ALL_CORE
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

## 2024-03-21 NOTE — DISCHARGE NOTE PROVIDER - CARE PROVIDER_API CALL
Meghan Ron  Internal Medicine  [address] [place] [address], [facility] [address]  [place] [address]  Phone: [phone]  Fax: [phone]  Follow Up Time: 1 week

## 2024-03-21 NOTE — DIETITIAN INITIAL EVALUATION ADULT - PERTINENT LABORATORY DATA
03-21    140  |  101  |  20  ----------------------------<  90  3.7   |  26  |  0.79    Ca    9.7      21 Mar 2024 07:16  Phos  3.0     03-21  Mg     1.9     03-21    TPro  7.7  /  Alb  4.0  /  TBili  0.3  /  DBili  x   /  AST  12  /  ALT  11  /  AlkPhos  71  03-21

## 2024-03-21 NOTE — DIETITIAN INITIAL EVALUATION ADULT - PROBLEM SELECTOR PLAN 1
Interrogate AICD for events. Evaluate for all functions/battery  Order TTE  PT eval  Obtain TSH/free T4  f/u EP recs

## 2024-03-21 NOTE — DIETITIAN INITIAL EVALUATION ADULT - ORAL INTAKE PTA/DIET HISTORY
Pt was eating well with no changes in appetite. Pt was following low Na diet. Denies oral nutrient supplement use; took multivitamin. Denies Hx of chewing or swallowing issues. Confirms no known food allergies.

## 2024-03-21 NOTE — PATIENT PROFILE ADULT - SAFE PLACE TO LIVE
SEVERITY:- OTHERWISE NORMAL ECG -

SINUS ARRHYTHMIA, RATE 57-89

:

Confirmed by: Roslyn Moreno 02-Jun-2020 19:42:30 no

## 2024-03-22 DIAGNOSIS — Z45.02 ENCOUNTER FOR ADJUSTMENT AND MANAGEMENT OF AUTOMATIC IMPLANTABLE CARDIAC DEFIBRILLATOR: ICD-10-CM

## 2024-03-22 LAB
MRSA PCR RESULT.: SIGNIFICANT CHANGE UP
S AUREUS DNA NOSE QL NAA+PROBE: DETECTED

## 2024-03-22 PROCEDURE — 70450 CT HEAD/BRAIN W/O DYE: CPT | Mod: 26

## 2024-03-22 RX ORDER — SODIUM CHLORIDE 9 MG/ML
500 INJECTION INTRAMUSCULAR; INTRAVENOUS; SUBCUTANEOUS ONCE
Refills: 0 | Status: COMPLETED | OUTPATIENT
Start: 2024-03-22 | End: 2024-03-22

## 2024-03-22 RX ADMIN — ATORVASTATIN CALCIUM 40 MILLIGRAM(S): 80 TABLET, FILM COATED ORAL at 20:34

## 2024-03-22 RX ADMIN — Medication 81 MILLIGRAM(S): at 11:25

## 2024-03-22 RX ADMIN — DORZOLAMIDE HYDROCHLORIDE TIMOLOL MALEATE 1 DROP(S): 20; 5 SOLUTION/ DROPS OPHTHALMIC at 17:16

## 2024-03-22 RX ADMIN — Medication 125 MICROGRAM(S): at 05:16

## 2024-03-22 RX ADMIN — Medication 20 MILLIGRAM(S): at 05:16

## 2024-03-22 RX ADMIN — SODIUM CHLORIDE 250 MILLILITER(S): 9 INJECTION INTRAMUSCULAR; INTRAVENOUS; SUBCUTANEOUS at 13:51

## 2024-03-22 RX ADMIN — GABAPENTIN 300 MILLIGRAM(S): 400 CAPSULE ORAL at 11:24

## 2024-03-22 RX ADMIN — Medication 40 MILLIGRAM(S): at 05:15

## 2024-03-22 RX ADMIN — CHLORHEXIDINE GLUCONATE 1 APPLICATION(S): 213 SOLUTION TOPICAL at 11:25

## 2024-03-22 RX ADMIN — Medication 1 SPRAY(S): at 05:15

## 2024-03-22 RX ADMIN — CARVEDILOL PHOSPHATE 25 MILLIGRAM(S): 80 CAPSULE, EXTENDED RELEASE ORAL at 05:15

## 2024-03-22 RX ADMIN — Medication 1 SPRAY(S): at 17:16

## 2024-03-22 RX ADMIN — AMLODIPINE BESYLATE 10 MILLIGRAM(S): 2.5 TABLET ORAL at 05:15

## 2024-03-22 RX ADMIN — Medication 25 MILLIGRAM(S): at 09:08

## 2024-03-22 RX ADMIN — LATANOPROST 1 DROP(S): 0.05 SOLUTION/ DROPS OPHTHALMIC; TOPICAL at 20:35

## 2024-03-22 RX ADMIN — Medication 500 MILLIGRAM(S): at 17:17

## 2024-03-22 RX ADMIN — DORZOLAMIDE HYDROCHLORIDE TIMOLOL MALEATE 1 DROP(S): 20; 5 SOLUTION/ DROPS OPHTHALMIC at 05:16

## 2024-03-22 RX ADMIN — Medication 500 MILLIGRAM(S): at 05:15

## 2024-03-23 LAB
ANION GAP SERPL CALC-SCNC: 13 MMOL/L — SIGNIFICANT CHANGE UP (ref 5–17)
BUN SERPL-MCNC: 16 MG/DL — SIGNIFICANT CHANGE UP (ref 7–23)
CALCIUM SERPL-MCNC: 9.3 MG/DL — SIGNIFICANT CHANGE UP (ref 8.4–10.5)
CHLORIDE SERPL-SCNC: 102 MMOL/L — SIGNIFICANT CHANGE UP (ref 96–108)
CO2 SERPL-SCNC: 24 MMOL/L — SIGNIFICANT CHANGE UP (ref 22–31)
CREAT SERPL-MCNC: 1 MG/DL — SIGNIFICANT CHANGE UP (ref 0.5–1.3)
EGFR: 56 ML/MIN/1.73M2 — LOW
GLUCOSE SERPL-MCNC: 98 MG/DL — SIGNIFICANT CHANGE UP (ref 70–99)
HCT VFR BLD CALC: 36.5 % — SIGNIFICANT CHANGE UP (ref 34.5–45)
HGB BLD-MCNC: 12 G/DL — SIGNIFICANT CHANGE UP (ref 11.5–15.5)
MCHC RBC-ENTMCNC: 30.5 PG — SIGNIFICANT CHANGE UP (ref 27–34)
MCHC RBC-ENTMCNC: 32.9 GM/DL — SIGNIFICANT CHANGE UP (ref 32–36)
MCV RBC AUTO: 92.9 FL — SIGNIFICANT CHANGE UP (ref 80–100)
NRBC # BLD: 0 /100 WBCS — SIGNIFICANT CHANGE UP (ref 0–0)
PLATELET # BLD AUTO: 86 K/UL — LOW (ref 150–400)
POTASSIUM SERPL-MCNC: 3.5 MMOL/L — SIGNIFICANT CHANGE UP (ref 3.5–5.3)
POTASSIUM SERPL-SCNC: 3.5 MMOL/L — SIGNIFICANT CHANGE UP (ref 3.5–5.3)
RBC # BLD: 3.93 M/UL — SIGNIFICANT CHANGE UP (ref 3.8–5.2)
RBC # FLD: 13.3 % — SIGNIFICANT CHANGE UP (ref 10.3–14.5)
SODIUM SERPL-SCNC: 139 MMOL/L — SIGNIFICANT CHANGE UP (ref 135–145)
WBC # BLD: 6.42 K/UL — SIGNIFICANT CHANGE UP (ref 3.8–10.5)
WBC # FLD AUTO: 6.42 K/UL — SIGNIFICANT CHANGE UP (ref 3.8–10.5)

## 2024-03-23 RX ORDER — ACETAMINOPHEN 500 MG
650 TABLET ORAL EVERY 6 HOURS
Refills: 0 | Status: DISCONTINUED | OUTPATIENT
Start: 2024-03-23 | End: 2024-03-26

## 2024-03-23 RX ORDER — MECLIZINE HCL 12.5 MG
12.5 TABLET ORAL EVERY 8 HOURS
Refills: 0 | Status: DISCONTINUED | OUTPATIENT
Start: 2024-03-23 | End: 2024-03-25

## 2024-03-23 RX ADMIN — CHLORHEXIDINE GLUCONATE 1 APPLICATION(S): 213 SOLUTION TOPICAL at 11:22

## 2024-03-23 RX ADMIN — Medication 12.5 MILLIGRAM(S): at 13:06

## 2024-03-23 RX ADMIN — Medication 1 SPRAY(S): at 17:13

## 2024-03-23 RX ADMIN — Medication 650 MILLIGRAM(S): at 09:29

## 2024-03-23 RX ADMIN — DORZOLAMIDE HYDROCHLORIDE TIMOLOL MALEATE 1 DROP(S): 20; 5 SOLUTION/ DROPS OPHTHALMIC at 05:15

## 2024-03-23 RX ADMIN — Medication 81 MILLIGRAM(S): at 11:22

## 2024-03-23 RX ADMIN — Medication 650 MILLIGRAM(S): at 02:15

## 2024-03-23 RX ADMIN — DORZOLAMIDE HYDROCHLORIDE TIMOLOL MALEATE 1 DROP(S): 20; 5 SOLUTION/ DROPS OPHTHALMIC at 17:14

## 2024-03-23 RX ADMIN — Medication 500 MILLIGRAM(S): at 05:15

## 2024-03-23 RX ADMIN — Medication 1 SPRAY(S): at 05:16

## 2024-03-23 RX ADMIN — Medication 650 MILLIGRAM(S): at 10:24

## 2024-03-23 RX ADMIN — Medication 650 MILLIGRAM(S): at 03:15

## 2024-03-23 RX ADMIN — GABAPENTIN 300 MILLIGRAM(S): 400 CAPSULE ORAL at 11:21

## 2024-03-23 RX ADMIN — Medication 125 MICROGRAM(S): at 05:15

## 2024-03-23 RX ADMIN — LATANOPROST 1 DROP(S): 0.05 SOLUTION/ DROPS OPHTHALMIC; TOPICAL at 21:16

## 2024-03-23 RX ADMIN — Medication 500 MILLIGRAM(S): at 17:10

## 2024-03-23 RX ADMIN — ATORVASTATIN CALCIUM 40 MILLIGRAM(S): 80 TABLET, FILM COATED ORAL at 21:16

## 2024-03-23 RX ADMIN — Medication 25 MILLIGRAM(S): at 05:16

## 2024-03-23 NOTE — PROVIDER CONTACT NOTE (OTHER) - ASSESSMENT
pt c/o pain to ICD sites/p gen change 3/21. pt with pain upon palpation surrounding site. slightly elevated. no nleeding to site. pt can raise arm, but does not have full ROM. radial pulse palpable +2. states she feels "like its pulling apart, kind of like pins and needles". no dehiscence to surgical site. pt c/o pain to ICD site/gen change 3/21. pt with pain upon palpation surrounding site. slightly swollen. no bleeding or dehiscence to surgical site. pt can raise arm, but does not have full ROM. radial pulse palpable +2. states she feels "like its pulling apart, kind of like being stuck with thorns".

## 2024-03-23 NOTE — PROVIDER CONTACT NOTE (OTHER) - BACKGROUND
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

## 2024-03-23 NOTE — PROVIDER CONTACT NOTE (OTHER) - ASSESSMENT
pt is A&Ox4 on RA, VSS as seen in flowsheet. pt denies SOB, chest pain, palpitations, HA/dizziness. pt  on telemetry s/p ICD gen change 3/21, SR 70s on monitor.

## 2024-03-24 LAB
ANION GAP SERPL CALC-SCNC: 13 MMOL/L — SIGNIFICANT CHANGE UP (ref 5–17)
BUN SERPL-MCNC: 14 MG/DL — SIGNIFICANT CHANGE UP (ref 7–23)
CALCIUM SERPL-MCNC: 9.6 MG/DL — SIGNIFICANT CHANGE UP (ref 8.4–10.5)
CHLORIDE SERPL-SCNC: 101 MMOL/L — SIGNIFICANT CHANGE UP (ref 96–108)
CO2 SERPL-SCNC: 23 MMOL/L — SIGNIFICANT CHANGE UP (ref 22–31)
CREAT SERPL-MCNC: 0.73 MG/DL — SIGNIFICANT CHANGE UP (ref 0.5–1.3)
EGFR: 82 ML/MIN/1.73M2 — SIGNIFICANT CHANGE UP
GLUCOSE SERPL-MCNC: 148 MG/DL — HIGH (ref 70–99)
HCT VFR BLD CALC: 37.4 % — SIGNIFICANT CHANGE UP (ref 34.5–45)
HGB BLD-MCNC: 12.3 G/DL — SIGNIFICANT CHANGE UP (ref 11.5–15.5)
MCHC RBC-ENTMCNC: 30.3 PG — SIGNIFICANT CHANGE UP (ref 27–34)
MCHC RBC-ENTMCNC: 32.9 GM/DL — SIGNIFICANT CHANGE UP (ref 32–36)
MCV RBC AUTO: 92.1 FL — SIGNIFICANT CHANGE UP (ref 80–100)
NRBC # BLD: 0 /100 WBCS — SIGNIFICANT CHANGE UP (ref 0–0)
PLATELET # BLD AUTO: 97 K/UL — LOW (ref 150–400)
POTASSIUM SERPL-MCNC: 4 MMOL/L — SIGNIFICANT CHANGE UP (ref 3.5–5.3)
POTASSIUM SERPL-SCNC: 4 MMOL/L — SIGNIFICANT CHANGE UP (ref 3.5–5.3)
RBC # BLD: 4.06 M/UL — SIGNIFICANT CHANGE UP (ref 3.8–5.2)
RBC # FLD: 13.2 % — SIGNIFICANT CHANGE UP (ref 10.3–14.5)
SODIUM SERPL-SCNC: 137 MMOL/L — SIGNIFICANT CHANGE UP (ref 135–145)
WBC # BLD: 6.11 K/UL — SIGNIFICANT CHANGE UP (ref 3.8–10.5)
WBC # FLD AUTO: 6.11 K/UL — SIGNIFICANT CHANGE UP (ref 3.8–10.5)

## 2024-03-24 RX ORDER — AMLODIPINE BESYLATE 2.5 MG/1
10 TABLET ORAL DAILY
Refills: 0 | Status: DISCONTINUED | OUTPATIENT
Start: 2024-03-24 | End: 2024-03-26

## 2024-03-24 RX ADMIN — Medication 1 SPRAY(S): at 17:52

## 2024-03-24 RX ADMIN — AMLODIPINE BESYLATE 10 MILLIGRAM(S): 2.5 TABLET ORAL at 17:52

## 2024-03-24 RX ADMIN — DORZOLAMIDE HYDROCHLORIDE TIMOLOL MALEATE 1 DROP(S): 20; 5 SOLUTION/ DROPS OPHTHALMIC at 05:35

## 2024-03-24 RX ADMIN — Medication 1 SPRAY(S): at 05:34

## 2024-03-24 RX ADMIN — ATORVASTATIN CALCIUM 40 MILLIGRAM(S): 80 TABLET, FILM COATED ORAL at 21:53

## 2024-03-24 RX ADMIN — CHLORHEXIDINE GLUCONATE 1 APPLICATION(S): 213 SOLUTION TOPICAL at 12:00

## 2024-03-24 RX ADMIN — DORZOLAMIDE HYDROCHLORIDE TIMOLOL MALEATE 1 DROP(S): 20; 5 SOLUTION/ DROPS OPHTHALMIC at 17:52

## 2024-03-24 RX ADMIN — GABAPENTIN 300 MILLIGRAM(S): 400 CAPSULE ORAL at 12:00

## 2024-03-24 RX ADMIN — Medication 125 MICROGRAM(S): at 05:34

## 2024-03-24 RX ADMIN — LATANOPROST 1 DROP(S): 0.05 SOLUTION/ DROPS OPHTHALMIC; TOPICAL at 21:53

## 2024-03-24 RX ADMIN — Medication 81 MILLIGRAM(S): at 12:00

## 2024-03-24 RX ADMIN — Medication 500 MILLIGRAM(S): at 05:34

## 2024-03-24 RX ADMIN — Medication 12.5 MILLIGRAM(S): at 05:34

## 2024-03-24 NOTE — CONSULT NOTE ADULT - ASSESSMENT
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 PT states when she dizziness was worse after procedure now is resolving w meclazine, only notices it when she goes to stand lasing a few seconds. She doesn't notice any room spinning. Pt denies any n/v, tinnitus, ear pain, congestion, recent URI, otorrhea, hearing loss, hx of sx or trauma or recent travel. per primary team orthostatics are normal. Marcos bass deferred at this time by pt 2/2 recent procedure
83-year-old F history of hypertension, hyperlipidemia, CHF with a BiV ICD ([employer]) in place presented to the emergency department from OP cardiologist's office for evaluation of battery at EOL. EKG revealing SR w/ LBBB 70's.     1) HF, recovered EF 2022 61%   2) LBBB   3) BiV ICD at EOL     - NPO sp mn for generator change tomorrow 3/21/24  - Hold Heparin SQ/Lovenox to prevent pocket hematoma pre and post generator change   - Continue tele monitoring, repletion of lytes for K>4 and Mg>2

## 2024-03-24 NOTE — CONSULT NOTE ADULT - PROBLEM SELECTOR RECOMMENDATION 9
Pt discussed in detail with Dr. Nolan, plan to   - continue meclazine   - pt would benefit from vestibular therapy out pt   - Pt is to follow up at [facility] ENT clinic upon discharge Call [phone] to make appointment or pt can try Carolann Felipe or Haim @ [phone].

## 2024-03-24 NOTE — CONSULT NOTE ADULT - SUBJECTIVE AND OBJECTIVE BOX
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 PT states when she dizziness was worse after procedure now is resolving with meclzaine, only notices it when she goes to stand lasing a few seconds. She doesn't notice any room spinning. Pt denies any n/v, tinnitus, ear pain, congestion, recent URI, otorrhea, hearing loss, hx of sx or trauma or recent travel. per primary team orthostatics are normal.     PAST MEDICAL & SURGICAL HISTORY:  Acute on chronic systolic congestive heart failure      Arthritis      Obesity      Pacemaker      S/P ICD (internal cardiac defibrillator) procedure        Allergies    No Known Allergies    Intolerances      MEDICATIONS  (STANDING):  amLODIPine   Tablet 10 milliGRAM(s) Oral daily  aspirin  chewable 81 milliGRAM(s) Oral daily  atorvastatin 40 milliGRAM(s) Oral at bedtime  chlorhexidine 2% Cloths 1 Application(s) Topical daily  dorzolamide 2%/timolol 0.5% Ophthalmic Solution 1 Drop(s) Both EYES two times a day  fluticasone propionate 50 MICROgram(s)/spray Nasal Spray 1 Spray(s) Both Nostrils two times a day  gabapentin 300 milliGRAM(s) Oral daily  latanoprost 0.005% Ophthalmic Solution 1 Drop(s) Both EYES at bedtime  levothyroxine 125 MICROGram(s) Oral daily    MEDICATIONS  (PRN):  acetaminophen     Tablet .. 650 milliGRAM(s) Oral every 6 hours PRN Mild Pain (1 - 3), Moderate Pain (4 - 6)  meclizine 12.5 milliGRAM(s) Oral every 8 hours PRN Dizziness      Social History: no tobacco, no etoh     Family history: Pt denies any sign FHx    ROS:   ENT: all negative except as noted in HPI   CV: denies palpitations  Pulm: denies SOB, cough, hemoptysis  GI: denies change in apetite, indigestion, n/v  : denies pertinent urinary symptoms, urgency  Neuro: denies numbness/tingling, loss of sensation  Psych: denies anxiety  MS: denies muscle weakness, instability  Heme: denies easy bruising or bleeding  Endo: denies heat/cold intolerance, excessive sweating  Vascular: denies LE edema    Vital Signs Last 24 Hrs  T(C): 37.3 (24 Mar 2024 11:09), Max: 37.3 (24 Mar 2024 11:09)  T(F): 99.2 (24 Mar 2024 11:09), Max: 99.2 (24 Mar 2024 11:09)  HR: 84 (24 Mar 2024 16:38) (75 - 84)  BP: 155/89 (24 Mar 2024 16:38) (128/75 - 168/84)  BP(mean): --  RR: 17 (24 Mar 2024 11:09) (17 - 18)  SpO2: 96% (24 Mar 2024 11:09) (94% - 96%)    Parameters below as of 24 Mar 2024 11:09  Patient On (Oxygen Delivery Method): room air                              12.3   6.11  )-----------( 97       ( 24 Mar 2024 12:29 )             37.4    03-24    137  |  101  |  14  ----------------------------<  148<H>  4.0   |  23  |  0.73    Ca    9.6      24 Mar 2024 12:29         PHYSICAL EXAM:  Gen: NAD- marya carolina pike deferred at this time by pt 2/2 recent procedure  Skin: No rashes, bruises, or lesions  Head: Normocephalic, Atraumatic  Face: no edema, erythema, or fluctuance. Parotid glands soft without mass  Eyes: no scleral injection  Ears: Right - ear canal clear, TM intact without effusion or erythema. No evidence of any fluid drainage. No mastoid tenderness, erythema, or ear bulging            Left - ear canal clear, TM intact without effusion or erythema. No evidence of any fluid drainage. No mastoid tenderness, erythema, or ear bulging  Nose: Nares bilaterally patent, no discharge  Mouth: No Stridor / Drooling / Trismus.  Mucosa moist, tongue/uvula midline, oropharynx clear  Neck: Flat, supple, no lymphadenopathy, trachea midline, no masses  Lymphatic: No lymphadenopathy  Resp: breathing easily, no stridor  CV: no peripheral edema/cyanosis  GI: nondistended   Peripheral vascular: no JVD or edema  Neuro: facial nerve intact, no facial droop

## 2024-03-25 RX ORDER — MECLIZINE HCL 12.5 MG
12.5 TABLET ORAL EVERY 8 HOURS
Refills: 0 | Status: DISCONTINUED | OUTPATIENT
Start: 2024-03-25 | End: 2024-03-26

## 2024-03-25 RX ORDER — FUROSEMIDE 40 MG
40 TABLET ORAL DAILY
Refills: 0 | Status: DISCONTINUED | OUTPATIENT
Start: 2024-03-25 | End: 2024-03-26

## 2024-03-25 RX ADMIN — Medication 1 SPRAY(S): at 17:10

## 2024-03-25 RX ADMIN — Medication 12.5 MILLIGRAM(S): at 05:31

## 2024-03-25 RX ADMIN — CHLORHEXIDINE GLUCONATE 1 APPLICATION(S): 213 SOLUTION TOPICAL at 11:29

## 2024-03-25 RX ADMIN — Medication 12.5 MILLIGRAM(S): at 13:06

## 2024-03-25 RX ADMIN — Medication 650 MILLIGRAM(S): at 10:02

## 2024-03-25 RX ADMIN — Medication 1 SPRAY(S): at 05:31

## 2024-03-25 RX ADMIN — Medication 81 MILLIGRAM(S): at 11:31

## 2024-03-25 RX ADMIN — Medication 650 MILLIGRAM(S): at 09:02

## 2024-03-25 RX ADMIN — Medication 12.5 MILLIGRAM(S): at 21:24

## 2024-03-25 RX ADMIN — Medication 125 MICROGRAM(S): at 05:31

## 2024-03-25 RX ADMIN — DORZOLAMIDE HYDROCHLORIDE TIMOLOL MALEATE 1 DROP(S): 20; 5 SOLUTION/ DROPS OPHTHALMIC at 05:31

## 2024-03-25 RX ADMIN — LATANOPROST 1 DROP(S): 0.05 SOLUTION/ DROPS OPHTHALMIC; TOPICAL at 21:24

## 2024-03-25 RX ADMIN — GABAPENTIN 300 MILLIGRAM(S): 400 CAPSULE ORAL at 11:30

## 2024-03-25 RX ADMIN — ATORVASTATIN CALCIUM 40 MILLIGRAM(S): 80 TABLET, FILM COATED ORAL at 21:23

## 2024-03-25 RX ADMIN — DORZOLAMIDE HYDROCHLORIDE TIMOLOL MALEATE 1 DROP(S): 20; 5 SOLUTION/ DROPS OPHTHALMIC at 17:09

## 2024-03-25 RX ADMIN — AMLODIPINE BESYLATE 10 MILLIGRAM(S): 2.5 TABLET ORAL at 05:33

## 2024-03-26 ENCOUNTER — TRANSCRIPTION ENCOUNTER (OUTPATIENT)
Age: 84
End: 2024-03-26

## 2024-03-26 VITALS
OXYGEN SATURATION: 95 % | SYSTOLIC BLOOD PRESSURE: 140 MMHG | TEMPERATURE: 98 F | DIASTOLIC BLOOD PRESSURE: 80 MMHG | RESPIRATION RATE: 18 BRPM | HEART RATE: 89 BPM

## 2024-03-26 PROCEDURE — 85025 COMPLETE CBC W/AUTO DIFF WBC: CPT

## 2024-03-26 PROCEDURE — C1882: CPT

## 2024-03-26 PROCEDURE — 87640 STAPH A DNA AMP PROBE: CPT

## 2024-03-26 PROCEDURE — 94640 AIRWAY INHALATION TREATMENT: CPT

## 2024-03-26 PROCEDURE — 99285 EMERGENCY DEPT VISIT HI MDM: CPT

## 2024-03-26 PROCEDURE — 84100 ASSAY OF PHOSPHORUS: CPT

## 2024-03-26 PROCEDURE — 97161 PT EVAL LOW COMPLEX 20 MIN: CPT

## 2024-03-26 PROCEDURE — 36415 COLL VENOUS BLD VENIPUNCTURE: CPT

## 2024-03-26 PROCEDURE — 86900 BLOOD TYPING SEROLOGIC ABO: CPT

## 2024-03-26 PROCEDURE — 80048 BASIC METABOLIC PNL TOTAL CA: CPT

## 2024-03-26 PROCEDURE — 84439 ASSAY OF FREE THYROXINE: CPT

## 2024-03-26 PROCEDURE — 70450 CT HEAD/BRAIN W/O DYE: CPT | Mod: MC

## 2024-03-26 PROCEDURE — 80053 COMPREHEN METABOLIC PANEL: CPT

## 2024-03-26 PROCEDURE — 86901 BLOOD TYPING SEROLOGIC RH(D): CPT

## 2024-03-26 PROCEDURE — 85027 COMPLETE CBC AUTOMATED: CPT

## 2024-03-26 PROCEDURE — 71045 X-RAY EXAM CHEST 1 VIEW: CPT

## 2024-03-26 PROCEDURE — 93306 TTE W/DOPPLER COMPLETE: CPT

## 2024-03-26 PROCEDURE — 33264 RMVL & RPLCMT DFB GEN MLT LD: CPT

## 2024-03-26 PROCEDURE — 97530 THERAPEUTIC ACTIVITIES: CPT

## 2024-03-26 PROCEDURE — 84484 ASSAY OF TROPONIN QUANT: CPT

## 2024-03-26 PROCEDURE — 86850 RBC ANTIBODY SCREEN: CPT

## 2024-03-26 PROCEDURE — 84443 ASSAY THYROID STIM HORMONE: CPT

## 2024-03-26 PROCEDURE — 97110 THERAPEUTIC EXERCISES: CPT

## 2024-03-26 PROCEDURE — 87641 MR-STAPH DNA AMP PROBE: CPT

## 2024-03-26 PROCEDURE — 97116 GAIT TRAINING THERAPY: CPT

## 2024-03-26 PROCEDURE — 93356 MYOCRD STRAIN IMG SPCKL TRCK: CPT

## 2024-03-26 PROCEDURE — 83690 ASSAY OF LIPASE: CPT

## 2024-03-26 PROCEDURE — 93005 ELECTROCARDIOGRAM TRACING: CPT

## 2024-03-26 PROCEDURE — 83880 ASSAY OF NATRIURETIC PEPTIDE: CPT

## 2024-03-26 PROCEDURE — 83735 ASSAY OF MAGNESIUM: CPT

## 2024-03-26 RX ORDER — MECLIZINE HCL 12.5 MG
1 TABLET ORAL
Qty: 45 | Refills: 0
Start: 2024-03-26 | End: 2024-04-09

## 2024-03-26 RX ORDER — FLUTICASONE PROPIONATE 50 MCG
1 SPRAY, SUSPENSION NASAL
Qty: 0 | Refills: 0 | DISCHARGE
Start: 2024-03-26

## 2024-03-26 RX ORDER — CARVEDILOL PHOSPHATE 80 MG/1
1 CAPSULE, EXTENDED RELEASE ORAL
Qty: 60 | Refills: 0
Start: 2024-03-26 | End: 2024-04-24

## 2024-03-26 RX ORDER — CARVEDILOL PHOSPHATE 80 MG/1
1 CAPSULE, EXTENDED RELEASE ORAL
Qty: 0 | Refills: 0 | DISCHARGE

## 2024-03-26 RX ADMIN — AMLODIPINE BESYLATE 10 MILLIGRAM(S): 2.5 TABLET ORAL at 05:57

## 2024-03-26 RX ADMIN — Medication 12.5 MILLIGRAM(S): at 13:04

## 2024-03-26 RX ADMIN — Medication 12.5 MILLIGRAM(S): at 05:57

## 2024-03-26 RX ADMIN — DORZOLAMIDE HYDROCHLORIDE TIMOLOL MALEATE 1 DROP(S): 20; 5 SOLUTION/ DROPS OPHTHALMIC at 05:57

## 2024-03-26 RX ADMIN — CHLORHEXIDINE GLUCONATE 1 APPLICATION(S): 213 SOLUTION TOPICAL at 13:03

## 2024-03-26 RX ADMIN — Medication 81 MILLIGRAM(S): at 13:03

## 2024-03-26 RX ADMIN — Medication 125 MICROGRAM(S): at 05:57

## 2024-03-26 RX ADMIN — GABAPENTIN 300 MILLIGRAM(S): 400 CAPSULE ORAL at 13:03

## 2024-03-26 RX ADMIN — Medication 40 MILLIGRAM(S): at 05:57

## 2024-03-26 RX ADMIN — Medication 1 SPRAY(S): at 06:40

## 2024-03-26 NOTE — PROGRESS NOTE ADULT - PROBLEM SELECTOR PROBLEM 2
AICD at end of battery life
AICD at end of battery life
Thrombocytopenia
AICD at end of battery life

## 2024-03-26 NOTE — PROGRESS NOTE ADULT - PROBLEM SELECTOR PLAN 4
euvolemic  will continue to monitor  resume furosemide
euvolemic  will continue to monitor  resume furosemide
resume home meds - amlodipine, coreg, enalapril
euvolemic  will continue to monitor  watch closely off meds and with IVF

## 2024-03-26 NOTE — CHART NOTE - NSCHARTNOTEFT_GEN_A_CORE
MEDICINE NP NOTE   Spectra [id number]    Patient will require a polyfly wheelchair due to Heart Failure I50.23. The beneficiary has a mobility limitation that significantly impairs her ability to participate in one or more MRADLs such as toileting, feeding, dressing, grooming, and bathing in customary locations in the home. The patient’s mobility limitation cannot be sufficiently resolved by the use of an appropriately fitted cane or walker. The patient is unable to ambulate with a walker. Use of a polyfly wheelchair will significantly improve the beneficiary’s ability to participate in MRADLs and the beneficiary will use it on a regular basis in the home. The beneficiary is able and willing to use the wheelchair in the home. The beneficiary has a caregiver who is available, willing and able to provide assistance with the wheelchair. The patient is not able to self propel a standard or lightweight wheelchair.

## 2024-03-26 NOTE — PROGRESS NOTE ADULT - PROBLEM SELECTOR PROBLEM 3
Thrombocytopenia
Heart failure with improved ejection fraction (HFimpEF)
Thrombocytopenia
Thrombocytopenia

## 2024-03-26 NOTE — PROGRESS NOTE ADULT - ASSESSMENT
83-year-old F history of hypertension, hyperlipidemia, CHF with a BiV ICD ([employer]) in place presented to the emergency department from OP cardiologist's office for evaluation of battery at EOL. EKG revealing SR w/ LBBB 70's.     1) HF, recovered EF 2022 61%   2) LBBB   3) BiV ICD at EOL     - NPO sp mn for generator change today 3/21/24  - Hold Heparin SQ/Lovenox to prevent pocket hematoma pre and post generator change   - Type and screen x 1 pending- ordered   - Continue tele monitoring, repletion of lytes for K>4 and Mg>    L.Galeotafiore AGACNP-BC  [id number]
83-year-old F history of hypertension, hyperlipidemia, CHF with a BiV ICD ([employer]) in place presented to the emergency department from OP cardiologist's office for evaluation of battery at EOL. EKG revealing SR w/ LBBB 70's.     1) HF, recovered EF 2022 61%   2) LBBB   3) BiV ICD at EOL     - s/p BIV ICD generator change 3/21/24, tolerated procedure well  Post generator change activity and restrictions reviewed with patient.    - avoid Heparin SQ/Lovenox to prevent pocket hematoma post generator change   - Follow up in EP Clinic on 4/3/24 at 9:40am for wound and device check   - Cleared by EP for discharge planning home today     [facility]  [phone]
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

## 2024-03-26 NOTE — PROGRESS NOTE ADULT - PROVIDER SPECIALTY LIST ADULT
Internal Medicine
Internal Medicine
Electrophysiology
Electrophysiology
Internal Medicine

## 2024-03-26 NOTE — PROGRESS NOTE ADULT - PROBLEM SELECTOR PLAN 3
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
no intervention at this time  chronic  outpatient follow up heme
no intervention at this time  chronic  outpatient follow up heme
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
no intervention at this time  chronic  outpatient follow up heme
euvolemic  will continue to monitor

## 2024-03-26 NOTE — PROGRESS NOTE ADULT - REASON FOR ADMISSION
lightheadedness

## 2024-03-26 NOTE — PROGRESS NOTE ADULT - PROBLEM SELECTOR PLAN 1
unclear etiology  CT head negative  ENT consultation appreciated  no intervention at this time  outpatient follow up vestibular therapy
unclear etiology  not orthostatic but symptomatic  will continue to hold BP meds for now however  I feel that her dizziness may be labrinthtis  meclizine ATC instead of PRN  will continue to monitor
s/p AICD generator change  discussed with EP attending   cleared for discharge   awaiting PT for disposition
CT head negative  ENT consultation appreciated  outpatient follow up vestibular therapy  continued hospitalization will be more hazardous for patient at this time  case management and PT have addressed patient safety at home with family  wheelchair provided to patient  daughter agreeable for discharge and undertakes responsibility for safety at home  as per patient daughter will be staying and working from home next few weeks  not provided to patient for same
unclear etiology  not orthostatic but symptomatic  will need to hold BP meds for now  resume as needed  BP running low today may be the reason  unsafe discharge per PT today
unclear etiology  CT head negative  ENT consultation to be requested

## 2024-03-26 NOTE — PROGRESS NOTE ADULT - NSPROGADDITIONALINFOA_GEN_ALL_CORE
discussed with patient in detail, expresses understanding of treatment plans.  discussed with covering ACP
discussed with covering ACP
discussed with covering ACP
discussed with patient in detail, expresses understanding of treatment plans.  discussed with covering ACP  MEDICALLY stable for discharge
discussed with patient in detail   discharge on hold
discussed with patient in detail, expresses understanding of treatment plans.  discussed with covering ACP

## 2024-03-26 NOTE — DISCHARGE NOTE NURSING/CASE MANAGEMENT/SOCIAL WORK - NSDCFUADDAPPT_GEN_ALL_CORE_FT
Pt is to follow up at [facility] ENT clinic upon discharge Call [phone] to make appointment or pt can try Carolann Felipe or Haim @ [phone].    Follow up in EP Clinic on 4/3/24 at 9:40am for wound and device check

## 2024-03-26 NOTE — PROGRESS NOTE ADULT - PROBLEM SELECTOR PLAN 5
hold BP meds   introduce gradually as needed
resume amlodipine 10 po qd  introduce gradually as needed
resume amlodipine 10 po qd  resume carvedilol 12.5 BID and lisinopril 10 po qd (reduced dose)  introduce gradually as needed
hold BP meds   introduce gradually as needed
resume amlodipine 10 po qd  introduce gradually as needed

## 2024-03-26 NOTE — PROGRESS NOTE ADULT - SUBJECTIVE AND OBJECTIVE BOX
24H hour events: No over night events.  Denies c/o CP, palpitations or SOB.  Pain at left chest wall incision site is tolerable, declines pain medication at this time.     MEDICATIONS:  amLODIPine   Tablet 10 milliGRAM(s) Oral daily  aspirin  chewable 81 milliGRAM(s) Oral daily  carvedilol 25 milliGRAM(s) Oral every 12 hours  enalapril 20 milliGRAM(s) Oral daily  furosemide    Tablet 40 milliGRAM(s) Oral daily    cephalexin 500 milliGRAM(s) Oral every 12 hours    gabapentin 300 milliGRAM(s) Oral daily  meclizine 25 milliGRAM(s) Oral every 8 hours PRN    atorvastatin 40 milliGRAM(s) Oral at bedtime  levothyroxine 125 MICROGram(s) Oral daily    chlorhexidine 2% Cloths 1 Application(s) Topical daily  dorzolamide 2%/timolol 0.5% Ophthalmic Solution 1 Drop(s) Both EYES two times a day  fluticasone propionate 50 MICROgram(s)/spray Nasal Spray 1 Spray(s) Both Nostrils two times a day  latanoprost 0.005% Ophthalmic Solution 1 Drop(s) Both EYES at bedtime      REVIEW OF SYSTEMS:  Complete 12point ROS negative.    PHYSICAL EXAM:  T(C): 36.6 (03-22-24 @ 04:41), Max: 36.9 (03-21-24 @ 21:16)  HR: 79 (03-22-24 @ 09:25) (60 - 87)  BP: 105/66 (03-22-24 @ 09:25) (95/50 - 172/74)  RR: 18 (03-22-24 @ 04:41) (14 - 18)  SpO2: 97% (03-22-24 @ 04:41) (96% - 98%)    21 Mar 2024 07:01  -  22 Mar 2024 07:00  --------------------------------------------------------  IN: 320 mL / OUT: 500 mL / NET: -180 mL    22 Mar 2024 07:01  -  22 Mar 2024 10:26  --------------------------------------------------------  IN: 240 mL / OUT: 0 mL / NET: 240 mL    Appearance: Normal	  HEENT:   Normal oral mucosa, PERRL, EOMI	  Cardiovascular: Normal S1 S2, No JVD, No murmurs, No edema  Respiratory: Lungs clear to auscultation	  Psychiatry: A & O x 3, Mood & affect appropriate  Gastrointestinal:  Soft, Non-tender, + BS	  Skin: Left chest wall infraclavicular wound site open to air with Dermabond, clean and intact.  Mild swelling, no erythema or drainage.   Extremities: Normal range of motion, No clubbing, cyanosis or edema  Vascular: Peripheral pulses palpable 2+ bilaterally    LABS:	 	    CBC Full  -  ( 21 Mar 2024 07:16 )  WBC Count : 3.96 K/uL  Hemoglobin : 12.0 g/dL  Hematocrit : 35.9 %  Platelet Count - Automated : 90 K/uL  Mean Cell Volume : 92.3 fl  Mean Cell Hemoglobin : 30.8 pg  Mean Cell Hemoglobin Concentration : 33.4 gm/dL  Auto Neutrophil # : 1.75 K/uL  Auto Lymphocyte # : 1.63 K/uL  Auto Monocyte # : 0.54 K/uL  Auto Eosinophil # : 0.02 K/uL  Auto Basophil # : 0.01 K/uL  Auto Neutrophil % : 44.1 %  Auto Lymphocyte % : 41.2 %  Auto Monocyte % : 13.6 %  Auto Eosinophil % : 0.5 %  Auto Basophil % : 0.3 %    03-21    140  |  101  |  20  ----------------------------<  90  3.7   |  26  |  0.79  03-20    138  |  100  |  15  ----------------------------<  117<H>  4.8   |  25  |  0.87    Ca    9.7      21 Mar 2024 07:16  Ca    9.9      20 Mar 2024 14:48  Phos  3.0     03-21  Mg     1.9     03-21  Mg     2.1     03-20    TPro  7.7  /  Alb  4.0  /  TBili  0.3  /  DBili  x   /  AST  12  /  ALT  11  /  AlkPhos  71  03-21  TPro  8.2  /  Alb  4.5  /  TBili  0.4  /  DBili  x   /  AST  25  /  ALT  14  /  AlkPhos  78  03-20      TELEMETRY: 	  NSR, V Paced 70-80's         
Patient is a 83y old  Female who presents with a chief complaint of lightheadedness (23 Mar 2024 16:53)      DATE OF SERVICE: 03-24-24 @ 16:20    SUBJECTIVE / OVERNIGHT EVENTS: overnight events noted    ROS:  Resp: No cough no sputum production  CVS: No chest pain no palpitations no orthopnea  GI: no N/V/D      MEDICATIONS  (STANDING):  aspirin  chewable 81 milliGRAM(s) Oral daily  atorvastatin 40 milliGRAM(s) Oral at bedtime  chlorhexidine 2% Cloths 1 Application(s) Topical daily  dorzolamide 2%/timolol 0.5% Ophthalmic Solution 1 Drop(s) Both EYES two times a day  fluticasone propionate 50 MICROgram(s)/spray Nasal Spray 1 Spray(s) Both Nostrils two times a day  gabapentin 300 milliGRAM(s) Oral daily  latanoprost 0.005% Ophthalmic Solution 1 Drop(s) Both EYES at bedtime  levothyroxine 125 MICROGram(s) Oral daily    MEDICATIONS  (PRN):  acetaminophen     Tablet .. 650 milliGRAM(s) Oral every 6 hours PRN Mild Pain (1 - 3), Moderate Pain (4 - 6)  meclizine 12.5 milliGRAM(s) Oral every 8 hours PRN Dizziness        CAPILLARY BLOOD GLUCOSE        I&O's Summary    23 Mar 2024 07:01  -  24 Mar 2024 07:00  --------------------------------------------------------  IN: 480 mL / OUT: 1500 mL / NET: -1020 mL    24 Mar 2024 07:01  -  24 Mar 2024 16:20  --------------------------------------------------------  IN: 480 mL / OUT: 550 mL / NET: -70 mL        Vital Signs Last 24 Hrs  T(C): 37.3 (24 Mar 2024 11:09), Max: 37.3 (24 Mar 2024 11:09)  T(F): 99.2 (24 Mar 2024 11:09), Max: 99.2 (24 Mar 2024 11:09)  HR: 84 (24 Mar 2024 11:09) (75 - 84)  BP: 168/84 (24 Mar 2024 11:09) (128/75 - 168/84)  BP(mean): --  RR: 17 (24 Mar 2024 11:09) (17 - 18)  SpO2: 96% (24 Mar 2024 11:09) (94% - 96%)    PHYSICAL EXAM:   CHEST/LUNG: clear   left AICD site clean  HEART: S1 S2; no murmurs   ABDOMEN: Soft, Nontender  EXTREMITIES:   no edema  NEUROLOGY: AO x 3 non-focal    LABS:                        12.3   6.11  )-----------( 97       ( 24 Mar 2024 12:29 )             37.4     03-24    137  |  101  |  14  ----------------------------<  148<H>  4.0   |  23  |  0.73    Ca    9.6      24 Mar 2024 12:29            Urinalysis Basic - ( 24 Mar 2024 12:29 )    Color: x / Appearance: x / SG: x / pH: x  Gluc: 148 mg/dL / Ketone: x  / Bili: x / Urobili: x   Blood: x / Protein: x / Nitrite: x   Leuk Esterase: x / RBC: x / WBC x   Sq Epi: x / Non Sq Epi: x / Bacteria: x          All consultant(s) notes reviewed and care discussed with other providers        Contact Number, Dr Dozier [phone]
24H hour events: Patient is in EP Lab for generator change today.     MEDICATIONS:  amLODIPine   Tablet 10 milliGRAM(s) Oral daily  aspirin  chewable 81 milliGRAM(s) Oral daily  carvedilol 25 milliGRAM(s) Oral every 12 hours  enalapril 20 milliGRAM(s) Oral daily  furosemide    Tablet 40 milliGRAM(s) Oral daily    gabapentin 300 milliGRAM(s) Oral daily  meclizine 25 milliGRAM(s) Oral every 8 hours PRN    atorvastatin 40 milliGRAM(s) Oral at bedtime  levothyroxine 125 MICROGram(s) Oral daily    dorzolamide 2%/timolol 0.5% Ophthalmic Solution 1 Drop(s) Both EYES two times a day  fluticasone propionate 50 MICROgram(s)/spray Nasal Spray 1 Spray(s) Both Nostrils two times a day  latanoprost 0.005% Ophthalmic Solution 1 Drop(s) Both EYES at bedtime    PHYSICAL EXAM:  T(C): 36.7 (03-21-24 @ 07:56), Max: 37 (03-21-24 @ 04:25)  HR: 76 (03-21-24 @ 07:56) (71 - 93)  BP: 138/69 (03-21-24 @ 07:56) (109/71 - 170/90)  RR: 16 (03-21-24 @ 07:56) (16 - 20)  SpO2: 96% (03-21-24 @ 07:56) (95% - 98%)    LABS:	 	    CBC Full  -  ( 21 Mar 2024 07:16 )  WBC Count : 3.96 K/uL  Hemoglobin : 12.0 g/dL  Hematocrit : 35.9 %  Platelet Count - Automated : 90 K/uL  Mean Cell Volume : 92.3 fl  Mean Cell Hemoglobin : 30.8 pg  Mean Cell Hemoglobin Concentration : 33.4 gm/dL  Auto Neutrophil # : 1.75 K/uL  Auto Lymphocyte # : 1.63 K/uL  Auto Monocyte # : 0.54 K/uL  Auto Eosinophil # : 0.02 K/uL  Auto Basophil # : 0.01 K/uL  Auto Neutrophil % : 44.1 %  Auto Lymphocyte % : 41.2 %  Auto Monocyte % : 13.6 %  Auto Eosinophil % : 0.5 %  Auto Basophil % : 0.3 %    03-21    140  |  101  |  20  ----------------------------<  90  3.7   |  26  |  0.79  03-20    138  |  100  |  15  ----------------------------<  117<H>  4.8   |  25  |  0.87    Ca    9.7      21 Mar 2024 07:16  Ca    9.9      20 Mar 2024 14:48  Phos  3.0     03-21  Mg     1.9     03-21  Mg     2.1     03-20    TPro  7.7  /  Alb  4.0  /  TBili  0.3  /  DBili  x   /  AST  12  /  ALT  11  /  AlkPhos  71  03-21  TPro  8.2  /  Alb  4.5  /  TBili  0.4  /  DBili  x   /  AST  25  /  ALT  14  /  AlkPhos  78  03-20        
Patient is a 83y old  Female who presents with a chief complaint of Encounter for checking or testing of cardiac pacemaker pulse generator     (21 Mar 2024 12:42)      DATE OF SERVICE: 03-21-24 @ 15:08    SUBJECTIVE / OVERNIGHT EVENTS: overnight events noted    ROS:  Resp: No cough no sputum production  CVS: No chest pain no palpitations no orthopnea  GI: no N/V/D  "I feel better"         MEDICATIONS  (STANDING):  amLODIPine   Tablet 10 milliGRAM(s) Oral daily  aspirin  chewable 81 milliGRAM(s) Oral daily  atorvastatin 40 milliGRAM(s) Oral at bedtime  carvedilol 25 milliGRAM(s) Oral every 12 hours  cephalexin 500 milliGRAM(s) Oral every 12 hours  chlorhexidine 2% Cloths 1 Application(s) Topical daily  dorzolamide 2%/timolol 0.5% Ophthalmic Solution 1 Drop(s) Both EYES two times a day  enalapril 20 milliGRAM(s) Oral daily  fluticasone propionate 50 MICROgram(s)/spray Nasal Spray 1 Spray(s) Both Nostrils two times a day  furosemide    Tablet 40 milliGRAM(s) Oral daily  gabapentin 300 milliGRAM(s) Oral daily  latanoprost 0.005% Ophthalmic Solution 1 Drop(s) Both EYES at bedtime  levothyroxine 125 MICROGram(s) Oral daily    MEDICATIONS  (PRN):  meclizine 25 milliGRAM(s) Oral every 8 hours PRN Dizziness        CAPILLARY BLOOD GLUCOSE        I&O's Summary    21 Mar 2024 07:01  -  21 Mar 2024 15:08  --------------------------------------------------------  IN: 200 mL / OUT: 0 mL / NET: 200 mL        Vital Signs Last 24 Hrs  T(C): 36.8 (21 Mar 2024 11:35), Max: 37 (21 Mar 2024 04:25)  T(F): 98.3 (21 Mar 2024 11:35), Max: 98.6 (21 Mar 2024 04:25)  HR: 66 (21 Mar 2024 11:35) (60 - 93)  BP: 124/72 (21 Mar 2024 11:35) (95/50 - 172/74)  BP(mean): --  RR: 18 (21 Mar 2024 11:35) (14 - 18)  SpO2: 96% (21 Mar 2024 11:35) (95% - 98%)    PHYSICAL EXAM:  NECK: Supple, No JVD  CHEST/LUNG: clear   left AICD site clean no hematoma  HEART: S1 S2; no murmurs   ABDOMEN: Soft, Nontender  EXTREMITIES:   no edema  NEUROLOGY: AO x 3 non-focal      LABS:                        12.0   3.96  )-----------( 90       ( 21 Mar 2024 07:16 )             35.9     03-21    140  |  101  |  20  ----------------------------<  90  3.7   |  26  |  0.79    Ca    9.7      21 Mar 2024 07:16  Phos  3.0     03-21  Mg     1.9     03-21    TPro  7.7  /  Alb  4.0  /  TBili  0.3  /  DBili  x   /  AST  12  /  ALT  11  /  AlkPhos  71  03-21          Urinalysis Basic - ( 21 Mar 2024 07:16 )    Color: x / Appearance: x / SG: x / pH: x  Gluc: 90 mg/dL / Ketone: x  / Bili: x / Urobili: x   Blood: x / Protein: x / Nitrite: x   Leuk Esterase: x / RBC: x / WBC x   Sq Epi: x / Non Sq Epi: x / Bacteria: x          All consultant(s) notes reviewed and care discussed with other providers        Contact Number, Dr Dozier [phone]
Patient is a 83y old  Female who presents with a chief complaint of lightheadedness (22 Mar 2024 07:45)      DATE OF SERVICE: 03-22-24 @ 13:37    SUBJECTIVE / OVERNIGHT EVENTS: overnight events noted    ROS:  Resp: No cough no sputum production  CVS: No chest pain no palpitations no orthopnea  GI: no N/V/D  'I feel fine now'        MEDICATIONS  (STANDING):  aspirin  chewable 81 milliGRAM(s) Oral daily  atorvastatin 40 milliGRAM(s) Oral at bedtime  cephalexin 500 milliGRAM(s) Oral every 12 hours  chlorhexidine 2% Cloths 1 Application(s) Topical daily  dorzolamide 2%/timolol 0.5% Ophthalmic Solution 1 Drop(s) Both EYES two times a day  fluticasone propionate 50 MICROgram(s)/spray Nasal Spray 1 Spray(s) Both Nostrils two times a day  gabapentin 300 milliGRAM(s) Oral daily  latanoprost 0.005% Ophthalmic Solution 1 Drop(s) Both EYES at bedtime  levothyroxine 125 MICROGram(s) Oral daily  sodium chloride 0.9% Bolus 500 milliLiter(s) IV Bolus once    MEDICATIONS  (PRN):  meclizine 25 milliGRAM(s) Oral every 8 hours PRN Dizziness        CAPILLARY BLOOD GLUCOSE        I&O's Summary    21 Mar 2024 07:01  -  22 Mar 2024 07:00  --------------------------------------------------------  IN: 320 mL / OUT: 500 mL / NET: -180 mL    22 Mar 2024 07:01  -  22 Mar 2024 13:37  --------------------------------------------------------  IN: 240 mL / OUT: 0 mL / NET: 240 mL        Vital Signs Last 24 Hrs  T(C): 36.7 (22 Mar 2024 10:55), Max: 36.9 (21 Mar 2024 21:16)  T(F): 98.1 (22 Mar 2024 10:55), Max: 98.4 (21 Mar 2024 21:16)  HR: 76 (22 Mar 2024 10:55) (72 - 87)  BP: 104/62 (22 Mar 2024 10:55) (97/71 - 168/90)  BP(mean): --  RR: 18 (22 Mar 2024 10:55) (17 - 18)  SpO2: 100% (22 Mar 2024 10:55) (96% - 100%)    PHYSICAL EXAM:  CHEST/LUNG: clear   left AICD site clean  HEART: S1 S2; no murmurs   ABDOMEN: Soft, Nontender  EXTREMITIES:   no edema  NEUROLOGY: AO x 3 non-focal    LABS:                        12.0   3.96  )-----------( 90       ( 21 Mar 2024 07:16 )             35.9     03-21    140  |  101  |  20  ----------------------------<  90  3.7   |  26  |  0.79    Ca    9.7      21 Mar 2024 07:16  Phos  3.0     03-21  Mg     1.9     03-21    TPro  7.7  /  Alb  4.0  /  TBili  0.3  /  DBili  x   /  AST  12  /  ALT  11  /  AlkPhos  71  03-21          Urinalysis Basic - ( 21 Mar 2024 07:16 )    Color: x / Appearance: x / SG: x / pH: x  Gluc: 90 mg/dL / Ketone: x  / Bili: x / Urobili: x   Blood: x / Protein: x / Nitrite: x   Leuk Esterase: x / RBC: x / WBC x   Sq Epi: x / Non Sq Epi: x / Bacteria: x          All consultant(s) notes reviewed and care discussed with other providers        Contact Number, Dr Dozier [phone]
Patient is a 83y old  Female who presents with a chief complaint of lightheadedness (24 Mar 2024 17:48)      DATE OF SERVICE: 03-25-24 @ 17:50    SUBJECTIVE / OVERNIGHT EVENTS: overnight events noted    ROS:  Resp: No cough no sputum production  CVS: No chest pain no palpitations no orthopnea  GI: no N/V/D  a little better        MEDICATIONS  (STANDING):  amLODIPine   Tablet 10 milliGRAM(s) Oral daily  aspirin  chewable 81 milliGRAM(s) Oral daily  atorvastatin 40 milliGRAM(s) Oral at bedtime  chlorhexidine 2% Cloths 1 Application(s) Topical daily  dorzolamide 2%/timolol 0.5% Ophthalmic Solution 1 Drop(s) Both EYES two times a day  fluticasone propionate 50 MICROgram(s)/spray Nasal Spray 1 Spray(s) Both Nostrils two times a day  gabapentin 300 milliGRAM(s) Oral daily  latanoprost 0.005% Ophthalmic Solution 1 Drop(s) Both EYES at bedtime  levothyroxine 125 MICROGram(s) Oral daily  meclizine 12.5 milliGRAM(s) Oral every 8 hours    MEDICATIONS  (PRN):  acetaminophen     Tablet .. 650 milliGRAM(s) Oral every 6 hours PRN Mild Pain (1 - 3), Moderate Pain (4 - 6)        CAPILLARY BLOOD GLUCOSE        I&O's Summary    24 Mar 2024 07:01  -  25 Mar 2024 07:00  --------------------------------------------------------  IN: 480 mL / OUT: 550 mL / NET: -70 mL    25 Mar 2024 07:01  -  25 Mar 2024 17:50  --------------------------------------------------------  IN: 400 mL / OUT: 200 mL / NET: 200 mL        Vital Signs Last 24 Hrs  T(C): 36.6 (25 Mar 2024 11:32), Max: 36.8 (24 Mar 2024 20:39)  T(F): 97.8 (25 Mar 2024 11:32), Max: 98.3 (24 Mar 2024 20:39)  HR: 111 (25 Mar 2024 16:42) (79 - 111)  BP: 149/79 (25 Mar 2024 16:42) (111/64 - 155/74)  BP(mean): --  RR: 18 (25 Mar 2024 11:32) (18 - 18)  SpO2: 97% (25 Mar 2024 11:32) (95% - 97%)    PHYSICAL EXAM:   CHEST/LUNG: clear   AICD site clean  HEART: S1 S2; no murmurs   ABDOMEN: Soft, Nontender  EXTREMITIES:   no edema  NEUROLOGY: AO x 3 non-focal    LABS:                        12.3   6.11  )-----------( 97       ( 24 Mar 2024 12:29 )             37.4     03-24    137  |  101  |  14  ----------------------------<  148<H>  4.0   |  23  |  0.73    Ca    9.6      24 Mar 2024 12:29            Urinalysis Basic - ( 24 Mar 2024 12:29 )    Color: x / Appearance: x / SG: x / pH: x  Gluc: 148 mg/dL / Ketone: x  / Bili: x / Urobili: x   Blood: x / Protein: x / Nitrite: x   Leuk Esterase: x / RBC: x / WBC x   Sq Epi: x / Non Sq Epi: x / Bacteria: x          All consultant(s) notes reviewed and care discussed with other providers        Contact Number, Dr Dozier [phone]
Patient is a 83y old  Female who presents with a chief complaint of lightheadedness (25 Mar 2024 17:39)      DATE OF SERVICE: 03-26-24 @ 13:08    SUBJECTIVE / OVERNIGHT EVENTS: overnight events noted    ROS:  Resp: No cough no sputum production  CVS: No chest pain no palpitations no orthopnea  GI: no N/V/D  "I feel much better"         MEDICATIONS  (STANDING):  amLODIPine   Tablet 10 milliGRAM(s) Oral daily  aspirin  chewable 81 milliGRAM(s) Oral daily  atorvastatin 40 milliGRAM(s) Oral at bedtime  chlorhexidine 2% Cloths 1 Application(s) Topical daily  dorzolamide 2%/timolol 0.5% Ophthalmic Solution 1 Drop(s) Both EYES two times a day  fluticasone propionate 50 MICROgram(s)/spray Nasal Spray 1 Spray(s) Both Nostrils two times a day  furosemide    Tablet 40 milliGRAM(s) Oral daily  gabapentin 300 milliGRAM(s) Oral daily  latanoprost 0.005% Ophthalmic Solution 1 Drop(s) Both EYES at bedtime  levothyroxine 125 MICROGram(s) Oral daily  meclizine 12.5 milliGRAM(s) Oral every 8 hours    MEDICATIONS  (PRN):  acetaminophen     Tablet .. 650 milliGRAM(s) Oral every 6 hours PRN Mild Pain (1 - 3), Moderate Pain (4 - 6)        CAPILLARY BLOOD GLUCOSE        I&O's Summary    25 Mar 2024 07:01  -  26 Mar 2024 07:00  --------------------------------------------------------  IN: 400 mL / OUT: 550 mL / NET: -150 mL    26 Mar 2024 07:01  -  26 Mar 2024 13:08  --------------------------------------------------------  IN: 640 mL / OUT: 0 mL / NET: 640 mL        Vital Signs Last 24 Hrs  T(C): 36.6 (26 Mar 2024 11:34), Max: 37.2 (25 Mar 2024 20:24)  T(F): 97.9 (26 Mar 2024 11:34), Max: 98.9 (25 Mar 2024 20:24)  HR: 90 (26 Mar 2024 11:34) (88 - 111)  BP: 134/81 (26 Mar 2024 11:34) (134/81 - 149/79)  BP(mean): --  RR: 18 (26 Mar 2024 11:34) (18 - 18)  SpO2: 95% (26 Mar 2024 11:34) (95% - 97%)      PHYSICAL EXAM:   CHEST/LUNG: clear   AICD site clean  HEART: S1 S2; no murmurs   ABDOMEN: Soft, Nontender  EXTREMITIES:   no edema  NEUROLOGY: AO x 3 non-focal    LABS:                      All consultant(s) notes reviewed and care discussed with other providers        Contact Number, Dr Dozier [phone]
Patient is a 83y old  Female who presents with a chief complaint of lightheadedness (22 Mar 2024 13:36)      DATE OF SERVICE: 03-23-24 @ 16:54    SUBJECTIVE / OVERNIGHT EVENTS: overnight events noted    ROS:  Resp: No cough no sputum production  CVS: No chest pain no palpitations no orthopnea  GI: no N/V/D  still dizzy, states has ringing in her right ear        MEDICATIONS  (STANDING):  aspirin  chewable 81 milliGRAM(s) Oral daily  atorvastatin 40 milliGRAM(s) Oral at bedtime  cephalexin 500 milliGRAM(s) Oral every 12 hours  chlorhexidine 2% Cloths 1 Application(s) Topical daily  dorzolamide 2%/timolol 0.5% Ophthalmic Solution 1 Drop(s) Both EYES two times a day  fluticasone propionate 50 MICROgram(s)/spray Nasal Spray 1 Spray(s) Both Nostrils two times a day  gabapentin 300 milliGRAM(s) Oral daily  latanoprost 0.005% Ophthalmic Solution 1 Drop(s) Both EYES at bedtime  levothyroxine 125 MICROGram(s) Oral daily    MEDICATIONS  (PRN):  acetaminophen     Tablet .. 650 milliGRAM(s) Oral every 6 hours PRN Mild Pain (1 - 3), Moderate Pain (4 - 6)  meclizine 12.5 milliGRAM(s) Oral every 8 hours PRN Dizziness        CAPILLARY BLOOD GLUCOSE        I&O's Summary    22 Mar 2024 07:01  -  23 Mar 2024 07:00  --------------------------------------------------------  IN: 650 mL / OUT: 1450 mL / NET: -800 mL    23 Mar 2024 07:01  -  23 Mar 2024 16:54  --------------------------------------------------------  IN: 480 mL / OUT: 300 mL / NET: 180 mL        Vital Signs Last 24 Hrs  T(C): 36.8 (23 Mar 2024 11:13), Max: 37.1 (22 Mar 2024 20:48)  T(F): 98.2 (23 Mar 2024 11:13), Max: 98.8 (22 Mar 2024 20:48)  HR: 80 (23 Mar 2024 16:40) (71 - 80)  BP: 148/80 (23 Mar 2024 16:40) (114/72 - 149/81)  BP(mean): --  RR: 17 (23 Mar 2024 11:13) (17 - 18)  SpO2: 95% (23 Mar 2024 11:13) (95% - 96%)      PHYSICAL EXAM: no nystagmus  CHEST/LUNG: clear   left AICD site clean  HEART: S1 S2; no murmurs   ABDOMEN: Soft, Nontender  EXTREMITIES:   no edema  NEUROLOGY: AO x 3 non-focal    LABS:                        12.0   6.42  )-----------( 86       ( 23 Mar 2024 06:08 )             36.5     03-23    139  |  102  |  16  ----------------------------<  98  3.5   |  24  |  1.00    Ca    9.3      23 Mar 2024 06:09            Urinalysis Basic - ( 23 Mar 2024 06:09 )    Color: x / Appearance: x / SG: x / pH: x  Gluc: 98 mg/dL / Ketone: x  / Bili: x / Urobili: x   Blood: x / Protein: x / Nitrite: x   Leuk Esterase: x / RBC: x / WBC x   Sq Epi: x / Non Sq Epi: x / Bacteria: x          All consultant(s) notes reviewed and care discussed with other providers        Contact Number, Dr Dozier [phone]

## 2024-03-26 NOTE — DISCHARGE NOTE NURSING/CASE MANAGEMENT/SOCIAL WORK - PATIENT PORTAL LINK FT
You can access the FollowMyHealth Patient Portal offered by [facility] by registering at the following website: [email or web address]. By joining [employer]’s FollowMyHealth portal, you will also be able to view your health information using other applications (apps) compatible with our system.

## 2024-03-26 NOTE — PROGRESS NOTE ADULT - PROBLEM SELECTOR PROBLEM 4
Heart failure with improved ejection fraction (HFimpEF)
HTN (hypertension)
Heart failure with improved ejection fraction (HFimpEF)

## 2024-03-26 NOTE — PROGRESS NOTE ADULT - PROBLEM SELECTOR PLAN 2
s/p AICD generator change  follow up EP after discharge   site clean
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
s/p AICD generator change  no EP intervention at this time

## 2024-04-03 ENCOUNTER — APPOINTMENT (OUTPATIENT)
Dept: ELECTROPHYSIOLOGY | Facility: CLINIC | Age: 84
End: 2024-04-03

## 2025-01-13 NOTE — ASU PATIENT PROFILE, ADULT - NS PRO MODE OF ARRIVAL
ambulatory The office will call you with the timing of your discharge appointments. Please call them at [phone] if you do not hear from them by end of day tomorrow.     You had a MCOT monitor (an external cardiac rhythm monitoring device) placed on your day of discharge.  This helps us monitor your heart while you are out of the hospital for 30 days after discharge. Should your heart go into an abnormal or dangerous rhythm you will receieve a call from the MCOT team and your Structural Heart team of Doctors and PA's will be notified.    1. Keep the monitor within 30 feet of you at all times.  2. When you feel any symptom (chest pain, dizziness, palpitations, weakness, fatigue or anything outside of your normal), press the “Record Symptoms” button on the main phone of your phone  3. Shower or exercise as normal whilewearing the MCOT Patch. Do not swim or take a bath. Patch is water-resistant, not waterproof  4. When the battery is low on the phone or on the device, use the supplied . The monitor will show a warning message when the battery is low.  5. Do not remove the patch from yourskin after you begin monitoring. With normal wear, each patch should last 5 days. To replace the patch follow instructions in the MCOT box with the Patch Guide  6. Any issues with the MCOT device or phone please call Customer Service at [phone].  7. If you have any other questions at all please call the Structural Heart office at [phone]

## 2025-01-14 ENCOUNTER — APPOINTMENT (OUTPATIENT)
Dept: CARDIOLOGY | Facility: CLINIC | Age: 85
End: 2025-01-14

## 2025-04-09 ENCOUNTER — APPOINTMENT (OUTPATIENT)
Dept: CARDIOLOGY | Facility: CLINIC | Age: 85
End: 2025-04-09
Payer: MEDICARE

## 2025-04-09 ENCOUNTER — NON-APPOINTMENT (OUTPATIENT)
Age: 85
End: 2025-04-09

## 2025-04-09 VITALS
DIASTOLIC BLOOD PRESSURE: 76 MMHG | WEIGHT: 216 LBS | OXYGEN SATURATION: 96 % | BODY MASS INDEX: 33.9 KG/M2 | HEIGHT: 67 IN | HEART RATE: 72 BPM | SYSTOLIC BLOOD PRESSURE: 140 MMHG

## 2025-04-09 DIAGNOSIS — R93.1 ABNORMAL FINDINGS ON DIAGNOSTIC IMAGING OF HEART AND CORONARY CIRCULATION: ICD-10-CM

## 2025-04-09 DIAGNOSIS — R06.09 OTHER FORMS OF DYSPNEA: ICD-10-CM

## 2025-04-09 DIAGNOSIS — R94.31 ABNORMAL ELECTROCARDIOGRAM [ECG] [EKG]: ICD-10-CM

## 2025-04-09 DIAGNOSIS — I50.9 HEART FAILURE, UNSPECIFIED: ICD-10-CM

## 2025-04-09 PROCEDURE — 99215 OFFICE O/P EST HI 40 MIN: CPT | Mod: 25

## 2025-04-09 PROCEDURE — 93000 ELECTROCARDIOGRAM COMPLETE: CPT

## 2025-04-10 PROBLEM — R06.09 DYSPNEA ON EFFORT: Status: ACTIVE | Noted: 2020-10-22

## 2025-04-17 ENCOUNTER — APPOINTMENT (OUTPATIENT)
Dept: CARDIOLOGY | Facility: CLINIC | Age: 85
End: 2025-04-17

## 2025-05-13 ENCOUNTER — APPOINTMENT (OUTPATIENT)
Dept: CARDIOLOGY | Facility: CLINIC | Age: 85
End: 2025-05-13
Payer: MEDICARE

## 2025-05-13 PROCEDURE — 78452 HT MUSCLE IMAGE SPECT MULT: CPT

## 2025-05-13 PROCEDURE — A9500: CPT

## 2025-05-13 PROCEDURE — 93015 CV STRESS TEST SUPVJ I&R: CPT

## 2025-05-21 ENCOUNTER — APPOINTMENT (OUTPATIENT)
Dept: CARDIOLOGY | Facility: CLINIC | Age: 85
End: 2025-05-21
Payer: MEDICARE

## 2025-05-21 ENCOUNTER — NON-APPOINTMENT (OUTPATIENT)
Age: 85
End: 2025-05-21

## 2025-05-21 VITALS
OXYGEN SATURATION: 97 % | HEART RATE: 79 BPM | SYSTOLIC BLOOD PRESSURE: 150 MMHG | HEIGHT: 67 IN | DIASTOLIC BLOOD PRESSURE: 78 MMHG | BODY MASS INDEX: 33.74 KG/M2 | WEIGHT: 215 LBS

## 2025-05-21 DIAGNOSIS — Z95.810 PRESENCE OF AUTOMATIC (IMPLANTABLE) CARDIAC DEFIBRILLATOR: ICD-10-CM

## 2025-05-21 PROCEDURE — 93289 INTERROG DEVICE EVAL HEART: CPT

## 2025-05-21 PROCEDURE — 93000 ELECTROCARDIOGRAM COMPLETE: CPT | Mod: 59

## 2025-05-21 PROCEDURE — 99215 OFFICE O/P EST HI 40 MIN: CPT | Mod: 25

## 2025-05-28 ENCOUNTER — APPOINTMENT (OUTPATIENT)
Dept: OPHTHALMOLOGY | Facility: CLINIC | Age: 85
End: 2025-05-28
Payer: MEDICARE

## 2025-05-28 ENCOUNTER — NON-APPOINTMENT (OUTPATIENT)
Age: 85
End: 2025-05-28

## 2025-05-28 PROCEDURE — 92133 CPTRZD OPH DX IMG PST SGM ON: CPT

## 2025-05-28 PROCEDURE — 92014 COMPRE OPH EXAM EST PT 1/>: CPT

## 2025-06-19 ENCOUNTER — APPOINTMENT (OUTPATIENT)
Age: 85
End: 2025-06-19
Payer: MEDICARE

## 2025-06-19 ENCOUNTER — NON-APPOINTMENT (OUTPATIENT)
Age: 85
End: 2025-06-19

## 2025-06-19 PROCEDURE — 92012 INTRM OPH EXAM EST PATIENT: CPT

## 2025-07-09 ENCOUNTER — APPOINTMENT (OUTPATIENT)
Dept: OPHTHALMOLOGY | Facility: CLINIC | Age: 85
End: 2025-07-09

## 2025-07-16 ENCOUNTER — APPOINTMENT (OUTPATIENT)
Dept: CARDIOLOGY | Facility: CLINIC | Age: 85
End: 2025-07-16
Payer: MEDICARE

## 2025-07-16 VITALS — HEART RATE: 79 BPM | OXYGEN SATURATION: 97 % | HEIGHT: 67 IN | BODY MASS INDEX: 33.59 KG/M2 | WEIGHT: 214 LBS

## 2025-07-16 VITALS — HEART RATE: 60 BPM

## 2025-07-16 VITALS — DIASTOLIC BLOOD PRESSURE: 70 MMHG | SYSTOLIC BLOOD PRESSURE: 140 MMHG

## 2025-07-16 PROBLEM — Z01.818 PRE-OPERATIVE CLEARANCE: Status: ACTIVE | Noted: 2025-07-16

## 2025-07-16 PROCEDURE — 93000 ELECTROCARDIOGRAM COMPLETE: CPT

## 2025-07-16 PROCEDURE — 99215 OFFICE O/P EST HI 40 MIN: CPT | Mod: 25

## 2025-07-16 RX ORDER — AMLODIPINE BESYLATE 10 MG/1
10 TABLET ORAL
Refills: 0 | Status: ACTIVE | COMMUNITY

## 2025-07-21 ENCOUNTER — APPOINTMENT (OUTPATIENT)
Dept: OPHTHALMOLOGY | Facility: AMBULATORY SURGERY CENTER | Age: 85
End: 2025-07-21
Payer: MEDICARE

## 2025-07-21 PROCEDURE — 66984 XCAPSL CTRC RMVL W/O ECP: CPT | Mod: RT

## 2025-07-22 ENCOUNTER — NON-APPOINTMENT (OUTPATIENT)
Age: 85
End: 2025-07-22

## 2025-07-22 ENCOUNTER — APPOINTMENT (OUTPATIENT)
Dept: OPHTHALMOLOGY | Facility: CLINIC | Age: 85
End: 2025-07-22
Payer: MEDICARE

## 2025-07-22 PROCEDURE — 99024 POSTOP FOLLOW-UP VISIT: CPT

## 2025-07-30 ENCOUNTER — APPOINTMENT (OUTPATIENT)
Dept: OPHTHALMOLOGY | Facility: CLINIC | Age: 85
End: 2025-07-30
Payer: MEDICARE

## 2025-07-30 ENCOUNTER — NON-APPOINTMENT (OUTPATIENT)
Age: 85
End: 2025-07-30

## 2025-07-30 PROCEDURE — 99024 POSTOP FOLLOW-UP VISIT: CPT

## 2025-07-30 PROCEDURE — 92134 CPTRZ OPH DX IMG PST SGM RTA: CPT

## 2025-08-19 ENCOUNTER — APPOINTMENT (OUTPATIENT)
Dept: OPHTHALMOLOGY | Facility: CLINIC | Age: 85
End: 2025-08-19

## 2025-08-20 ENCOUNTER — APPOINTMENT (OUTPATIENT)
Dept: CARDIOLOGY | Facility: CLINIC | Age: 85
End: 2025-08-20